# Patient Record
Sex: FEMALE | Race: OTHER | HISPANIC OR LATINO | Employment: OTHER | URBAN - METROPOLITAN AREA
[De-identification: names, ages, dates, MRNs, and addresses within clinical notes are randomized per-mention and may not be internally consistent; named-entity substitution may affect disease eponyms.]

---

## 2023-06-24 ENCOUNTER — APPOINTMENT (EMERGENCY)
Dept: RADIOLOGY | Facility: HOSPITAL | Age: 58
End: 2023-06-24
Payer: COMMERCIAL

## 2023-06-24 ENCOUNTER — HOSPITAL ENCOUNTER (INPATIENT)
Facility: HOSPITAL | Age: 58
LOS: 3 days | Discharge: HOME HEALTH | End: 2023-06-28
Attending: EMERGENCY MEDICINE | Admitting: INTERNAL MEDICINE
Payer: COMMERCIAL

## 2023-06-24 DIAGNOSIS — R26.2 AMBULATORY DYSFUNCTION: Primary | ICD-10-CM

## 2023-06-24 DIAGNOSIS — M79.604 RIGHT LEG PAIN: ICD-10-CM

## 2023-06-24 DIAGNOSIS — E66.01 MORBID OBESITY (HCC): ICD-10-CM

## 2023-06-24 PROBLEM — M25.569 KNEE PAIN: Status: ACTIVE | Noted: 2023-06-24

## 2023-06-24 LAB
ALBUMIN SERPL BCP-MCNC: 4.2 G/DL (ref 3.5–5)
ALP SERPL-CCNC: 114 U/L (ref 34–104)
ALT SERPL W P-5'-P-CCNC: 21 U/L (ref 7–52)
ANION GAP SERPL CALCULATED.3IONS-SCNC: 9 MMOL/L
AST SERPL W P-5'-P-CCNC: 26 U/L (ref 13–39)
BASOPHILS # BLD AUTO: 0.05 THOUSANDS/ÂΜL (ref 0–0.1)
BASOPHILS NFR BLD AUTO: 1 % (ref 0–1)
BILIRUB SERPL-MCNC: 0.53 MG/DL (ref 0.2–1)
BUN SERPL-MCNC: 23 MG/DL (ref 5–25)
CALCIUM SERPL-MCNC: 10 MG/DL (ref 8.4–10.2)
CHLORIDE SERPL-SCNC: 102 MMOL/L (ref 96–108)
CO2 SERPL-SCNC: 29 MMOL/L (ref 21–32)
CREAT SERPL-MCNC: 0.93 MG/DL (ref 0.6–1.3)
D DIMER PPP FEU-MCNC: 0.72 UG/ML FEU
EOSINOPHIL # BLD AUTO: 0.14 THOUSAND/ÂΜL (ref 0–0.61)
EOSINOPHIL NFR BLD AUTO: 1 % (ref 0–6)
ERYTHROCYTE [DISTWIDTH] IN BLOOD BY AUTOMATED COUNT: 14.7 % (ref 11.6–15.1)
GFR SERPL CREATININE-BSD FRML MDRD: 69 ML/MIN/1.73SQ M
GLUCOSE SERPL-MCNC: 100 MG/DL (ref 65–140)
HCT VFR BLD AUTO: 39.7 % (ref 34.8–46.1)
HGB BLD-MCNC: 12.6 G/DL (ref 11.5–15.4)
IMM GRANULOCYTES # BLD AUTO: 0.05 THOUSAND/UL (ref 0–0.2)
IMM GRANULOCYTES NFR BLD AUTO: 1 % (ref 0–2)
LYMPHOCYTES # BLD AUTO: 1.85 THOUSANDS/ÂΜL (ref 0.6–4.47)
LYMPHOCYTES NFR BLD AUTO: 18 % (ref 14–44)
MCH RBC QN AUTO: 28.5 PG (ref 26.8–34.3)
MCHC RBC AUTO-ENTMCNC: 31.7 G/DL (ref 31.4–37.4)
MCV RBC AUTO: 90 FL (ref 82–98)
MONOCYTES # BLD AUTO: 1.06 THOUSAND/ÂΜL (ref 0.17–1.22)
MONOCYTES NFR BLD AUTO: 10 % (ref 4–12)
NEUTROPHILS # BLD AUTO: 7.31 THOUSANDS/ÂΜL (ref 1.85–7.62)
NEUTS SEG NFR BLD AUTO: 69 % (ref 43–75)
NRBC BLD AUTO-RTO: 0 /100 WBCS
PLATELET # BLD AUTO: 257 THOUSANDS/UL (ref 149–390)
PMV BLD AUTO: 9 FL (ref 8.9–12.7)
POTASSIUM SERPL-SCNC: 3.7 MMOL/L (ref 3.5–5.3)
PROT SERPL-MCNC: 8.4 G/DL (ref 6.4–8.4)
RBC # BLD AUTO: 4.42 MILLION/UL (ref 3.81–5.12)
SODIUM SERPL-SCNC: 140 MMOL/L (ref 135–147)
WBC # BLD AUTO: 10.46 THOUSAND/UL (ref 4.31–10.16)

## 2023-06-24 PROCEDURE — 96374 THER/PROPH/DIAG INJ IV PUSH: CPT

## 2023-06-24 PROCEDURE — 99284 EMERGENCY DEPT VISIT MOD MDM: CPT | Performed by: EMERGENCY MEDICINE

## 2023-06-24 PROCEDURE — 85025 COMPLETE CBC W/AUTO DIFF WBC: CPT | Performed by: EMERGENCY MEDICINE

## 2023-06-24 PROCEDURE — 73564 X-RAY EXAM KNEE 4 OR MORE: CPT

## 2023-06-24 PROCEDURE — 80053 COMPREHEN METABOLIC PANEL: CPT | Performed by: EMERGENCY MEDICINE

## 2023-06-24 PROCEDURE — 85379 FIBRIN DEGRADATION QUANT: CPT | Performed by: EMERGENCY MEDICINE

## 2023-06-24 PROCEDURE — 99283 EMERGENCY DEPT VISIT LOW MDM: CPT

## 2023-06-24 PROCEDURE — 99223 1ST HOSP IP/OBS HIGH 75: CPT | Performed by: FAMILY MEDICINE

## 2023-06-24 PROCEDURE — 36415 COLL VENOUS BLD VENIPUNCTURE: CPT | Performed by: EMERGENCY MEDICINE

## 2023-06-24 RX ORDER — ACETAMINOPHEN 325 MG/1
975 TABLET ORAL EVERY 8 HOURS SCHEDULED
Status: DISCONTINUED | OUTPATIENT
Start: 2023-06-24 | End: 2023-06-28 | Stop reason: HOSPADM

## 2023-06-24 RX ORDER — CELECOXIB 200 MG/1
200 CAPSULE ORAL 2 TIMES DAILY
COMMUNITY

## 2023-06-24 RX ORDER — METHOCARBAMOL 500 MG/1
500 TABLET, FILM COATED ORAL ONCE
Status: COMPLETED | OUTPATIENT
Start: 2023-06-24 | End: 2023-06-24

## 2023-06-24 RX ORDER — HYDRALAZINE HYDROCHLORIDE 20 MG/ML
10 INJECTION INTRAMUSCULAR; INTRAVENOUS EVERY 4 HOURS PRN
Status: DISCONTINUED | OUTPATIENT
Start: 2023-06-24 | End: 2023-06-28 | Stop reason: HOSPADM

## 2023-06-24 RX ORDER — HYDROCHLOROTHIAZIDE 25 MG/1
25 TABLET ORAL DAILY
Status: DISCONTINUED | OUTPATIENT
Start: 2023-06-25 | End: 2023-06-28 | Stop reason: HOSPADM

## 2023-06-24 RX ORDER — CARVEDILOL 12.5 MG/1
1 TABLET ORAL 2 TIMES DAILY WITH MEALS
COMMUNITY
Start: 2023-05-08

## 2023-06-24 RX ORDER — ATORVASTATIN CALCIUM 20 MG/1
1 TABLET, FILM COATED ORAL EVERY EVENING
COMMUNITY
Start: 2023-02-06

## 2023-06-24 RX ORDER — ATORVASTATIN CALCIUM 20 MG/1
20 TABLET, FILM COATED ORAL EVERY EVENING
Status: DISCONTINUED | OUTPATIENT
Start: 2023-06-24 | End: 2023-06-28 | Stop reason: HOSPADM

## 2023-06-24 RX ORDER — CARVEDILOL 12.5 MG/1
12.5 TABLET ORAL 2 TIMES DAILY WITH MEALS
Status: DISCONTINUED | OUTPATIENT
Start: 2023-06-25 | End: 2023-06-24

## 2023-06-24 RX ORDER — PANTOPRAZOLE SODIUM 40 MG/1
40 TABLET, DELAYED RELEASE ORAL
Status: DISCONTINUED | OUTPATIENT
Start: 2023-06-25 | End: 2023-06-28 | Stop reason: HOSPADM

## 2023-06-24 RX ORDER — ENOXAPARIN SODIUM 100 MG/ML
40 INJECTION SUBCUTANEOUS 2 TIMES DAILY
Status: DISCONTINUED | OUTPATIENT
Start: 2023-06-24 | End: 2023-06-28 | Stop reason: HOSPADM

## 2023-06-24 RX ORDER — LEVOCETIRIZINE DIHYDROCHLORIDE 5 MG/1
5 TABLET, FILM COATED ORAL EVERY EVENING
COMMUNITY

## 2023-06-24 RX ORDER — FUROSEMIDE 10 MG/ML
40 INJECTION INTRAMUSCULAR; INTRAVENOUS
Status: COMPLETED | OUTPATIENT
Start: 2023-06-25 | End: 2023-06-25

## 2023-06-24 RX ORDER — FEXOFENADINE HCL 180 MG/1
180 TABLET ORAL DAILY
COMMUNITY

## 2023-06-24 RX ORDER — LOSARTAN POTASSIUM 50 MG/1
100 TABLET ORAL DAILY
Status: DISCONTINUED | OUTPATIENT
Start: 2023-06-25 | End: 2023-06-28 | Stop reason: HOSPADM

## 2023-06-24 RX ORDER — ESOMEPRAZOLE MAGNESIUM 40 MG/1
1 CAPSULE, DELAYED RELEASE ORAL
COMMUNITY
Start: 2023-04-17

## 2023-06-24 RX ORDER — HYDROCODONE BITARTRATE AND ACETAMINOPHEN 5; 325 MG/1; MG/1
2 TABLET ORAL EVERY 4 HOURS PRN
COMMUNITY

## 2023-06-24 RX ORDER — KETOROLAC TROMETHAMINE 30 MG/ML
15 INJECTION, SOLUTION INTRAMUSCULAR; INTRAVENOUS ONCE
Status: COMPLETED | OUTPATIENT
Start: 2023-06-24 | End: 2023-06-24

## 2023-06-24 RX ORDER — LOSARTAN POTASSIUM AND HYDROCHLOROTHIAZIDE 25; 100 MG/1; MG/1
1 TABLET ORAL DAILY
COMMUNITY
Start: 2023-04-10

## 2023-06-24 RX ORDER — CARVEDILOL 12.5 MG/1
12.5 TABLET ORAL 2 TIMES DAILY WITH MEALS
Status: DISCONTINUED | OUTPATIENT
Start: 2023-06-24 | End: 2023-06-26

## 2023-06-24 RX ORDER — OXYCODONE HYDROCHLORIDE 5 MG/1
5 TABLET ORAL EVERY 4 HOURS PRN
Status: DISCONTINUED | OUTPATIENT
Start: 2023-06-24 | End: 2023-06-28 | Stop reason: HOSPADM

## 2023-06-24 RX ORDER — OXYCODONE HYDROCHLORIDE 10 MG/1
10 TABLET ORAL EVERY 4 HOURS PRN
Status: DISCONTINUED | OUTPATIENT
Start: 2023-06-24 | End: 2023-06-28 | Stop reason: HOSPADM

## 2023-06-24 RX ADMIN — KETOROLAC TROMETHAMINE 15 MG: 30 INJECTION, SOLUTION INTRAMUSCULAR at 20:35

## 2023-06-24 RX ADMIN — ACETAMINOPHEN 975 MG: 325 TABLET, FILM COATED ORAL at 23:42

## 2023-06-24 RX ADMIN — METHOCARBAMOL 500 MG: 500 TABLET ORAL at 20:35

## 2023-06-24 RX ADMIN — CARVEDILOL 12.5 MG: 12.5 TABLET, FILM COATED ORAL at 23:42

## 2023-06-24 RX ADMIN — ATORVASTATIN CALCIUM 20 MG: 40 TABLET, FILM COATED ORAL at 23:42

## 2023-06-24 RX ADMIN — ENOXAPARIN SODIUM 40 MG: 40 INJECTION SUBCUTANEOUS at 23:43

## 2023-06-25 LAB
ANION GAP SERPL CALCULATED.3IONS-SCNC: 7 MMOL/L
BUN SERPL-MCNC: 26 MG/DL (ref 5–25)
CALCIUM SERPL-MCNC: 9.7 MG/DL (ref 8.4–10.2)
CHLORIDE SERPL-SCNC: 103 MMOL/L (ref 96–108)
CO2 SERPL-SCNC: 30 MMOL/L (ref 21–32)
CREAT SERPL-MCNC: 0.82 MG/DL (ref 0.6–1.3)
GFR SERPL CREATININE-BSD FRML MDRD: 80 ML/MIN/1.73SQ M
GLUCOSE SERPL-MCNC: 104 MG/DL (ref 65–140)
PLATELET # BLD AUTO: 255 THOUSANDS/UL (ref 149–390)
PMV BLD AUTO: 8.9 FL (ref 8.9–12.7)
POTASSIUM SERPL-SCNC: 3.3 MMOL/L (ref 3.5–5.3)
SODIUM SERPL-SCNC: 140 MMOL/L (ref 135–147)

## 2023-06-25 PROCEDURE — 99232 SBSQ HOSP IP/OBS MODERATE 35: CPT | Performed by: NURSE PRACTITIONER

## 2023-06-25 PROCEDURE — 80048 BASIC METABOLIC PNL TOTAL CA: CPT | Performed by: FAMILY MEDICINE

## 2023-06-25 PROCEDURE — 85049 AUTOMATED PLATELET COUNT: CPT | Performed by: FAMILY MEDICINE

## 2023-06-25 PROCEDURE — 97167 OT EVAL HIGH COMPLEX 60 MIN: CPT

## 2023-06-25 PROCEDURE — 97163 PT EVAL HIGH COMPLEX 45 MIN: CPT

## 2023-06-25 RX ORDER — FLUTICASONE PROPIONATE 50 MCG
1 SPRAY, SUSPENSION (ML) NASAL DAILY
COMMUNITY

## 2023-06-25 RX ORDER — LANOLIN ALCOHOL/MO/W.PET/CERES
400 CREAM (GRAM) TOPICAL 2 TIMES DAILY
Status: DISCONTINUED | OUTPATIENT
Start: 2023-06-25 | End: 2023-06-28 | Stop reason: HOSPADM

## 2023-06-25 RX ORDER — MULTIVIT-MINERALS/FOLIC ACID 0.4 MG
1 TABLET ORAL DAILY
Status: DISCONTINUED | OUTPATIENT
Start: 2023-06-25 | End: 2023-06-28 | Stop reason: HOSPADM

## 2023-06-25 RX ORDER — PHENOL 1.4 %
10 AEROSOL, SPRAY (ML) MUCOUS MEMBRANE
COMMUNITY

## 2023-06-25 RX ORDER — FLUTICASONE PROPIONATE 50 MCG
1 SPRAY, SUSPENSION (ML) NASAL DAILY
Status: DISCONTINUED | OUTPATIENT
Start: 2023-06-25 | End: 2023-06-28 | Stop reason: HOSPADM

## 2023-06-25 RX ORDER — POTASSIUM CHLORIDE 20 MEQ/1
40 TABLET, EXTENDED RELEASE ORAL
Status: COMPLETED | OUTPATIENT
Start: 2023-06-25 | End: 2023-06-25

## 2023-06-25 RX ORDER — CELECOXIB 100 MG/1
200 CAPSULE ORAL 2 TIMES DAILY
Status: DISCONTINUED | OUTPATIENT
Start: 2023-06-25 | End: 2023-06-28 | Stop reason: HOSPADM

## 2023-06-25 RX ADMIN — CARVEDILOL 12.5 MG: 12.5 TABLET, FILM COATED ORAL at 08:25

## 2023-06-25 RX ADMIN — ENOXAPARIN SODIUM 40 MG: 40 INJECTION SUBCUTANEOUS at 08:26

## 2023-06-25 RX ADMIN — ENOXAPARIN SODIUM 40 MG: 40 INJECTION SUBCUTANEOUS at 17:35

## 2023-06-25 RX ADMIN — HYDROCHLOROTHIAZIDE 25 MG: 25 TABLET ORAL at 08:26

## 2023-06-25 RX ADMIN — ATORVASTATIN CALCIUM 20 MG: 40 TABLET, FILM COATED ORAL at 17:35

## 2023-06-25 RX ADMIN — CELECOXIB 200 MG: 100 CAPSULE ORAL at 17:35

## 2023-06-25 RX ADMIN — POTASSIUM CHLORIDE 40 MEQ: 1500 TABLET, EXTENDED RELEASE ORAL at 22:41

## 2023-06-25 RX ADMIN — PANTOPRAZOLE SODIUM 40 MG: 40 TABLET, DELAYED RELEASE ORAL at 05:34

## 2023-06-25 RX ADMIN — ACETAMINOPHEN 975 MG: 325 TABLET, FILM COATED ORAL at 22:41

## 2023-06-25 RX ADMIN — POTASSIUM CHLORIDE 40 MEQ: 1500 TABLET, EXTENDED RELEASE ORAL at 18:41

## 2023-06-25 RX ADMIN — FUROSEMIDE 40 MG: 10 INJECTION, SOLUTION INTRAMUSCULAR; INTRAVENOUS at 17:51

## 2023-06-25 RX ADMIN — CELECOXIB 200 MG: 100 CAPSULE ORAL at 08:26

## 2023-06-25 RX ADMIN — POTASSIUM CHLORIDE 40 MEQ: 1500 TABLET, EXTENDED RELEASE ORAL at 20:19

## 2023-06-25 RX ADMIN — FLUTICASONE PROPIONATE 1 SPRAY: 50 SPRAY, METERED NASAL at 08:26

## 2023-06-25 RX ADMIN — OXYCODONE HYDROCHLORIDE 5 MG: 5 TABLET ORAL at 08:28

## 2023-06-25 RX ADMIN — OXYCODONE HYDROCHLORIDE 10 MG: 10 TABLET ORAL at 23:40

## 2023-06-25 RX ADMIN — ACETAMINOPHEN 975 MG: 325 TABLET, FILM COATED ORAL at 05:34

## 2023-06-25 RX ADMIN — CARVEDILOL 12.5 MG: 12.5 TABLET, FILM COATED ORAL at 17:35

## 2023-06-25 RX ADMIN — OXYCODONE HYDROCHLORIDE 10 MG: 10 TABLET ORAL at 13:10

## 2023-06-25 RX ADMIN — OXYCODONE HYDROCHLORIDE 10 MG: 10 TABLET ORAL at 17:34

## 2023-06-25 RX ADMIN — LOSARTAN POTASSIUM 100 MG: 50 TABLET, FILM COATED ORAL at 08:26

## 2023-06-25 RX ADMIN — ACETAMINOPHEN 975 MG: 325 TABLET, FILM COATED ORAL at 13:10

## 2023-06-25 RX ADMIN — FUROSEMIDE 40 MG: 10 INJECTION, SOLUTION INTRAMUSCULAR; INTRAVENOUS at 08:27

## 2023-06-25 RX ADMIN — Medication 400 MG: at 18:41

## 2023-06-25 NOTE — PROGRESS NOTES
[address] [place] [address]  Progress Note  Name: Kalli Sanchez  MRN: [id number]  Unit/Bed#: -01 I Date of Admission: 6/24/2023   Date of Service: 6/25/2023 I Hospital Day: 0    Assessment/Plan   * Knee pain  Assessment & Plan  · Knee pain with ambulatory dysfunction  · Suspicion for DVT with the way the patient describing her pain  Could be a ruptured Baker's cyst  · Follow-up with venous ultrasound  · May need to take a CT scan if the x-ray is negative as well  · PT OT evaluation  · Already has ambulatory dysfunction is the left hip  Patient can barely move it  Morbid obesity ([facility] [address] )  Assessment & Plan  · Encourage weight loss  · Patient received 2 doses of Lasix since the patient has some water retention as per the family and the patient  Hypertension  Assessment & Plan  · Continue home medications         VTE Pharmacologic Prophylaxis: VTE Score: 5 High Risk (Score >/= 5) - Pharmacological DVT Prophylaxis Ordered: enoxaparin (Lovenox)  Sequential Compression Devices Ordered  Patient Centered Rounds: I performed bedside rounds with nursing staff today  Discussions with Specialists or Other Care Team Provider: Reviewed notes    Education and Discussions with Family / Patient: Discussed with patient    Total Time Spent on Date of Encounter in care of patient: 35 minutes This time was spent on one or more of the following: performing physical exam; counseling and coordination of care; obtaining or reviewing history; documenting in the medical record; reviewing/ordering tests, medications or procedures; communicating with other healthcare professionals and discussing with patient's family/caregivers      Current Length of Stay: 0 day(s)  Current Patient Status: Observation   Certification Statement: The patient will continue to require additional inpatient hospital stay due to IV diuretics, pending x-ray, pending ultrasound pending PT evaluation  Discharge Plan: Anticipate discharge in 24-48 hrs to discharge location to be determined pending rehab evaluations  Code Status: Level 1 - Full Code    Subjective:   Patient is sitting up in bed with family at the bedside she has some concerns and fears related to being hospitalized understands that she will likely be going to rehab in the next day or 2 and is agreeable she reports that she is still having knee pain denies any chest pain chest tightness shortness of breath or difficulty breathing    Objective:     Vitals:   Temp (24hrs), Av 4 °F (36 9 °C), Min:98 3 °F (36 8 °C), Max:98 4 °F (36 9 °C)    Temp:  [98 3 °F (36 8 °C)-98 4 °F (36 9 °C)] 98 3 °F (36 8 °C)  HR:  [83-89] 89  Resp:  [18-20] 18  BP: (135-195)/(84-91) 135/84  SpO2:  [93 %-99 %] 93 %  Body mass index is 75 56 kg/m²  Input and Output Summary (last 24 hours): Intake/Output Summary (Last 24 hours) at 2023 0943  Last data filed at 2023 0533  Gross per 24 hour   Intake --   Output 725 ml   Net -725 ml       Physical Exam:   Physical Exam  Vitals reviewed  Cardiovascular:      Rate and Rhythm: Normal rate  Pulmonary:      Effort: No respiratory distress  Abdominal:      Palpations: Abdomen is soft  Skin:     General: Skin is warm  Neurological:      Mental Status: She is alert  Mental status is at baseline     Psychiatric:         Mood and Affect: Mood normal           Additional Data:     Labs:  Results from last 7 days   Lab Units 23   WBC Thousand/uL  --  10 46*   HEMOGLOBIN g/dL  --  12 6   HEMATOCRIT %  --  39 7   PLATELETS Thousands/uL 255 257   NEUTROS PCT %  --  69   LYMPHS PCT %  --  18   MONOS PCT %  --  10   EOS PCT %  --  1     Results from last 7 days   Lab Units 23   SODIUM mmol/L 140 140   POTASSIUM mmol/L 3 3* 3 7   CHLORIDE mmol/L 103 102   CO2 mmol/L 30 29   BUN mg/dL 26* 23   CREATININE mg/dL 0 82 0 93   ANION GAP mmol/L 7 9   CALCIUM mg/dL 9 7 10 0   ALBUMIN g/dL  --  4 2   TOTAL BILIRUBIN mg/dL  --  0 53   ALK PHOS U/L  --  114*   ALT U/L  --  21   AST U/L  --  26   GLUCOSE RANDOM mg/dL 104 100                       Lines/Drains:  Invasive Devices     Peripheral Intravenous Line  Duration           Peripheral IV 06/24/23 Right Antecubital <1 day                      Imaging: No pertinent imaging reviewed  Recent Cultures (last 7 days):         Last 24 Hours Medication List:   Current Facility-Administered Medications   Medication Dose Route Frequency Provider Last Rate   • acetaminophen  975 mg Oral Q8H [facility] Juan Daniel Dietrich MD     • atorvastatin  20 mg Oral QPM Juan Daniel Dietrich MD     • carvedilol  12 5 mg Oral BID With Meals Hermelinda Palma PA-C     • celecoxib  200 mg Oral BID Dorothy Lundberg PA-C     • enoxaparin  40 mg Subcutaneous BID Juan Daniel Dietrich MD     • fluticasone  1 spray Each Nare Daily Dorothy Lundberg PA-C     • furosemide  40 mg Intravenous BID (diuretic) Juan Daniel Dietrich MD     • hydrALAZINE  10 mg Intravenous Q4H PRN Juan Daniel Dietrich MD     • losartan  100 mg Oral Daily Juan Daniel Dietrich MD      And   • hydrochlorothiazide  25 mg Oral Daily Juan Daniel Dietrich MD     • morphine injection  2 mg Intravenous Q4H PRN Juan Daniel Dietrich MD     • One-A-Day Menopause Formula  1 tablet Oral Daily Dorothy Lundberg PA-C     • oxyCODONE  10 mg Oral Q4H PRN Juan Daniel Dietrich MD     • oxyCODONE  5 mg Oral Q4H PRN Juan Daniel Dietrich MD     • pantoprazole  40 mg Oral Early Morning Juan Daniel Dietrich MD          Today, Patient Was Seen By: ZEKE Joyce    **Please Note: This note may have been constructed using a voice recognition system  **

## 2023-06-25 NOTE — ASSESSMENT & PLAN NOTE
· Knee pain with ambulatory dysfunction  · Suspicion for DVT with the way the patient describing her pain  Could be a ruptured Baker's cyst  · Follow-up with venous ultrasound  · May need to take a CT scan if the x-ray is negative as well  · PT OT evaluation  · Already has ambulatory dysfunction is the left hip  Patient can barely move it

## 2023-06-25 NOTE — ASSESSMENT & PLAN NOTE
Body mass index is 75 56 kg/m²      · Encourage weight loss, dietary modifications   · Patient received 2 doses of Lasix

## 2023-06-25 NOTE — UTILIZATION REVIEW
"Initial Clinical Review    OBS order 6/24 2148 converted to IP on 6/25 1441 cont stay for amb dysfunction r/t Hip and knee pain   Admission: Date/Time/Statement:   Admission Orders (From admission, onward)     Ordered        06/25/23 1441  Inpatient Admission  Once            06/24/23 2148  Place in Observation  Once                       Inpatient Admission     Standing Status:   Standing     Number of Occurrences:   1     Order Specific Question:   Level of Care     Answer:   Med Surg [16]     Order Specific Question:   Estimated length of stay     Answer:   More than 2 Midnights     Order Specific Question:   Certification     Answer:   I certify that inpatient services are medically necessary for this patient for a duration of greater than two midnights  See H&P and MD Progress Notes for additional information about the patient's course of treatment  ED Arrival Information     Expected   -    Arrival   6/24/2023 18:32    Acuity   Less Urgent            Means of arrival   Ambulance    Escorted by   Nini Bonds   Hospitalist    Admission type   Emergency            Arrival complaint   Unable to stand            Chief Complaint   Patient presents with   • Knee Pain     The pt stated that 4 days ago she felt a \"pop\" in her R knee while trying stand up from the toilet  Since then the pt has not been able to bare weight and her pain hasnt gotten better  Initial Presentation: 56 y o  female to ED from home via EMS w/ R knee pain   Difficulty ambulating   she has had increased leg swelling and some water retention  Admitted OBS status w/ knee pain suspicion for DVT , could be ruptured Bakers cyst   F/u venous US   PT OT eval   HTN cont home meds   HLD statin   MO give 2 doses of lasix , pt w/ water retention   6/25 IM Note   Already has ambulatory dysfunction is the left hip  Patient can barely move it  PT OT eval pending   F/u venous US   Cont IV lasix        Date: 6/26  Day 3: Has " surpassed a 2nd midnight with active treatments and services, which include safe DC plan , likely STR   Cont monitoring of BP       ED Triage Vitals   Temperature Pulse Respirations Blood Pressure SpO2   06/24/23 2222 06/24/23 1835 06/24/23 1835 06/24/23 1835 06/24/23 1835   98 4 °F (36 9 °C) 83 20 (!) 195/91 99 %      Temp src Heart Rate Source Patient Position - Orthostatic VS BP Location FiO2 (%)   -- 06/24/23 1835 06/24/23 1835 06/24/23 1835 --    Monitor Lying Right arm       Pain Score       06/24/23 2235       No Pain          Wt Readings from Last 1 Encounters:   06/24/23 (!) 181 kg (399 lb 14 6 oz)     Additional Vital Signs:   06/26/23 10:04:15 -- 80 -- 158/79 105 91 % -- --   06/26/23 07:32:57 -- 84 -- 171/97 Abnormal  122 93 % -- --   06/26/23 0732 -- 89 -- 171/91 Abnormal  -- -- -- --   06/25/23 22:37:03 98 °F (36 7 °C) 81 -- 177/96 Abnormal  123 94 % -- --   06/25/23 17:39:21 98 5 °F (36 9 °C) 95 18 147/94 112 95 % -- --   06/25/23 1735 -- 90 -- 147/93 -- -- -- --   06/25/23 07:50:26 98 3 °F (36 8 °C) 89 18 135/84 101 93 % None (Room air) --       06/24/23 22:22:52 98 4 °F (36 9 °C) 86 -- 184/90 Abnormal  121 94 %        Pertinent Labs/Diagnostic Test Results:   XR knee 4+ vw right injury   Final Result by Dominik Olivares MD (06/25 1056)      No acute osseous abnormality  Degenerative changes as described              Workstation performed: [id number]         VAS upper limb venous duplex scan, unilateral/limited    (Results Pending)         Results from last 7 days   Lab Units 06/25/23  0524 06/24/23 2035   WBC Thousand/uL  --  10 46*   HEMOGLOBIN g/dL  --  12 6   HEMATOCRIT %  --  39 7   PLATELETS Thousands/uL 255 257   NEUTROS ABS Thousands/µL  --  7 31         Results from last 7 days   Lab Units 06/25/23  0524 06/24/23  2035   SODIUM mmol/L 140 140   POTASSIUM mmol/L 3 3* 3 7   CHLORIDE mmol/L 103 102   CO2 mmol/L 30 29   ANION GAP mmol/L 7 9   BUN mg/dL 26* 23   CREATININE mg/dL 0 82 0 93   EGFR ml/min/1 73sq m 80 69   CALCIUM mg/dL 9 7 10 0     Results from last 7 days   Lab Units 06/24/23 2035   AST U/L 26   ALT U/L 21   ALK PHOS U/L 114*   TOTAL PROTEIN g/dL 8 4   ALBUMIN g/dL 4 2   TOTAL BILIRUBIN mg/dL 0 53     Results from last 7 days   Lab Units 06/25/23  0524 06/24/23 2035   GLUCOSE RANDOM mg/dL 104 100     Results from last 7 days   Lab Units 06/24/23 2156   D-DIMER QUANTITATIVE ug/ml FEU 0 72*     ED Treatment:   Medication Administration from 06/24/2023 1832 to 06/24/2023 2212       Date/Time Order Dose Route Action Action by Comments     06/24/2023 2035 EDT ketorolac (TORADOL) injection 15 mg 15 mg Intravenous Given Emogene Hernandez --     06/24/2023 2035 EDT methocarbamol (ROBAXIN) tablet 500 mg 500 mg Oral Given Emogene Hernandez --        Past Medical History:   Diagnosis Date   • Hyperlipidemia    • Hypertension    • [facility] )    • Rheumatoid arthritis ([facility] )      Present on Admission:  • Knee pain  • Hypertension  • Morbid obesity ([facility] )      Admitting Diagnosis: Knee pain [M25 569]  Right leg pain [M79 604]  Ambulatory dysfunction [R26 2]  Age/Sex: 56 y o  female  Admission Orders:  Scheduled Medications:  acetaminophen, 975 mg, Oral, Q8H [address]  atorvastatin, 20 mg, Oral, QPM  carvedilol, 12 5 mg, Oral, BID With Meals  celecoxib, 200 mg, Oral, BID  enoxaparin, 40 mg, Subcutaneous, BID  fluticasone, 1 spray, Each Nare, Daily  furosemide, 40 mg, Intravenous, BID (diuretic)  losartan, 100 mg, Oral, Daily   And  hydrochlorothiazide, 25 mg, Oral, Daily  One-A-Day Menopause Formula, 1 tablet, Oral, Daily  pantoprazole, 40 mg, Oral, Early Morning      Continuous IV Infusions:     PRN Meds:  hydrALAZINE, 10 mg, Intravenous, Q4H PRN  morphine injection, 2 mg, Intravenous, Q4H PRN  oxyCODONE, 10 mg, Oral, Q4H PRN  oxyCODONE, 5 mg, Oral, Q4H PRN    PT OT eval   Up and OOb   Reg diet       Network Utilization Review Department  ATTENTION: Please call with any questions or concerns to [phone] and carefully listen to the prompts so that you are directed to the right person  All voicemails are confidential   Brenden Tolliver all requests for admission clinical reviews, approved or denied determinations and any other requests to dedicated fax number below belonging to the campus where the patient is receiving treatment   List of dedicated fax numbers for the Facilities:  [address] DENIALS (Administrative/Medical Necessity) [phone]   [address] (Maternity/NICU/Pediatrics) [phone]   [address] [phone]   [facility] [phone]   [address] [address] [facility] [place] [address] [place] [address] [place] [address] [facility] [phone]   1556 [facility] Devika Mcnally [facility] 134 815 [facility] [phone]

## 2023-06-25 NOTE — PLAN OF CARE
Problem: OCCUPATIONAL THERAPY ADULT  Goal: Performs self-care activities at highest level of function for planned discharge setting  See evaluation for individualized goals  Description: Treatment Interventions: ADL retraining, Functional transfer training, Endurance training, Equipment evaluation/education, Compensatory technique education, Activityengagement, UE strengthening/ROM          See flowsheet documentation for full assessment, interventions and recommendations  Note: Limitation: Decreased ADL status, Decreased UE strength, Decreased endurance, Decreased self-care trans, Decreased high-level ADLs  Prognosis: Good  Assessment: Patient is a 56 y o  female seen for OT evaluation s/p admit to [id number] [place] [address]  on 6/24/2023 w/Knee pain  Commorbidities affecting patient's functional performance at time of assessment include: HTN, HLD, and obesity  Orders placed for OT evaluation and treatment  Performed at least two patient identifiers during session including name and wristband  Prior to admission, Patient reporting being independent with ADLs/IADLs, ambulatory with rollator, and lives with   Personal factors affecting patient at time of initial evaluation include: steps to enter, difficulty performing ADLs and difficulty performing IADLs  Upon evaluation, patient requires minimal  assist for UB ADLs, moderate and maximal assist for LB ADLs, transfers with moderate assist of 2, with the use of Rolling Walker  Patient is oriented x 4  Occupational performance is affected by the following deficits: decreased muscle strength, dynamic sit/ stand balance deficit with poor standing tolerance time for self care and functional mobility, decreased activity tolerance, increased pain, delayed righting and equilibrium reactions and poor trunk control   Patient to benefit from continued Occupational Therapy treatment while in the hospital to address deficits as defined above and maximize level of functional independence with ADLs and functional mobility  Occupational Performance areas to address include: grooming , bathing/ shower, dressing, toilet hygiene, transfer to all surfaces and functional ambulation  From OT standpoint, recommendation at time of d/c would be Post acute rehabilitation services       OT Discharge Recommendation: Post acute rehabilitation services     [facility] OTD, OTR/L

## 2023-06-25 NOTE — H&P
[address] [place] [address]  H&P  Name: Abigail Eli 56 y o  female I MRN: [id number]  Unit/Bed#: ED 13 I Date of Admission: 6/24/2023   Date of Service: 6/24/2023 I Hospital Day: 0      Assessment/Plan   Morbid obesity ([address] )  Assessment & Plan  · Encourage weight loss  · I will give the patient 2 doses of Lasix since the patient has some water retention as per the family and the patient  Hyperlipidemia  Assessment & Plan  · Continue statin    Hypertension  Assessment & Plan  · Continue home medications  · As needed blood pressure medication    * Knee pain  Assessment & Plan  · Knee pain with ambulatory dysfunction  · Suspicion for DVT with the way the patient describing her pain  Could be a ruptured Baker's cyst  · Follow-up with venous ultrasound  · May need to take a CT scan if the x-ray is negative as well  · PT OT evaluation  · Already has ambulatory dysfunction is the left hip  Patient can barely move it  · May benefit from rehab         VTE Prophylaxis: Enoxaparin (Lovenox)  / sequential compression device   Code Status: Full code  POLST: POLST is not applicable to this patient  Discussion with family: Family at the bedside    Anticipated Length of Stay:  Patient will be admitted on an Observation basis with an anticipated length of stay of less than 2 midnights  Justification for Hospital Stay: Anticipate less than 2 midnights but given the patient's significant ambulatory dysfunction if the patient is not able to be discharged tomorrow I would have a low threshold of making her inpatient    Total Time for Visit, including Counseling / Coordination of Care: 60 minutes  Greater than 50% of this total time spent on direct patient counseling and coordination of care  Chief Complaint:   Right knee pain    History of Present Illness:    Abigail Eli is a 56 y o  female who presents with right knee pain     This is a very pleasant 56-year-old female patient acute right knee pain  She says she has tenderness in the back of the knee and she is having difficulty ambulating  She already has some left hip problems where she cannot really move her left leg and she usually ambulates with a walker however she is having difficulty ambulating since she hurt her knee  She initially stated it started with what felt like a cramp in the back of her knee but then she twisted a certain way and felt a pop and then gradually she has been unable to bear weight on that leg  Patient also states she has had increased leg swelling and some water retention  Patient thinks she may have gained significant weight in the past year  Patient denies any chest pain or shortness of breath  Review of Systems:    Review of Systems   Constitutional: Positive for activity change and fatigue  Cardiovascular: Positive for leg swelling  Musculoskeletal: Positive for arthralgias and gait problem  All other systems reviewed and are negative  Past Medical and Surgical History:     Past Medical History:   Diagnosis Date   • Hyperlipidemia    • Hypertension    • Morbid obesity ([facility] [address] )        History reviewed  No pertinent surgical history  Meds/Allergies:    Prior to Admission medications    Medication Sig Start Date End Date Taking? Authorizing Provider   atorvastatin (LIPITOR) 20 mg tablet Take 1 tablet by mouth every evening 2/6/23  Yes Historical Provider, MD   carvedilol (COREG) 12 5 mg tablet Take 1 tablet by mouth 2 (two) times a day with meals 5/8/23  Yes Historical Provider, MD   esomeprazole (NexIUM) 40 MG capsule Take 1 capsule by mouth daily before breakfast 4/17/23  Yes Historical Provider, MD   losartan-hydrochlorothiazide (HYZAAR) 100-25 MG per tablet Take 1 tablet by mouth daily 4/10/23  Yes Historical Provider, MD     I have reviewed home medications with patient personally      Allergies: No Known Allergies    Social History:     Marital Status: /Civil Union     Patient Pre-hospital Living Situation: Lives with her family  Patient Pre-hospital Level of Mobility: Ambulates with a walker  Patient Pre-hospital Diet Restrictions: None  Substance Use History:   Social History     Substance and Sexual Activity   Alcohol Use Never     Social History     Tobacco Use   Smoking Status Never   Smokeless Tobacco Not on file     Social History     Substance and Sexual Activity   Drug Use Never       Family History:    Family History   Problem Relation Age of Onset   • Coronary artery disease Maternal Grandfather    • Diabetes Maternal Grandfather        Physical Exam:     Vitals:   Blood Pressure: (!) 195/91 (06/24/23 1835)  Pulse: 83 (06/24/23 1835)  Respirations: 20 (06/24/23 1835)  SpO2: 99 % (06/24/23 1835)    Physical Exam    (   General Appearance:    Alert, cooperative, no distress, appears stated age   Head:    Normocephalic, without obvious abnormality, atraumatic   Eyes:    PERRL, conjunctiva/corneas clear, EOM's intact,             Nose:   Nares normal, septum midline, mucosa normal   Throat:   Lips, mucosa, and tongue normal; teeth and gums normal       Back:     Symmetric, no curvature, ROM normal, no CVA tenderness   Lungs:     Clear to auscultation bilaterally, respirations unlabored       Heart:    Regular rate and rhythm, S1 and S2 normal, no murmur, rub    or gallop   Abdomen:     Soft, non-tender, bowel sounds active all four quadrants,     no masses, no organomegaly           Extremities:  Patient with some chronic venous changes  She also has some tenderness over the right knee anterior surface just superior to the patella  Patient does have some edema   Pulses:   2+ and symmetric all extremities   Skin:   Skin color, texture, turgor normal, no rashes or lesions       Neurologic:   CNII-XII intact  Normal strength, sensation and reflexes       throughout         Additional Data:     Lab Results: I have personally reviewed pertinent reports        Results from last 7 days   Lab Units 06/24/23 2035   WBC Thousand/uL 10 46*   HEMOGLOBIN g/dL 12 6   HEMATOCRIT % 39 7   PLATELETS Thousands/uL 257   NEUTROS PCT % 69   LYMPHS PCT % 18   MONOS PCT % 10   EOS PCT % 1     Results from last 7 days   Lab Units 06/24/23 2035   SODIUM mmol/L 140   POTASSIUM mmol/L 3 7   CHLORIDE mmol/L 102   CO2 mmol/L 29   BUN mg/dL 23   CREATININE mg/dL 0 93   ANION GAP mmol/L 9   CALCIUM mg/dL 10 0   ALBUMIN g/dL 4 2   TOTAL BILIRUBIN mg/dL 0 53   ALK PHOS U/L 114*   ALT U/L 21   AST U/L 26   GLUCOSE RANDOM mg/dL 100                       Imaging: I have personally reviewed pertinent reports  XR knee 4+ vw right injury    (Results Pending)   VAS upper limb venous duplex scan, unilateral/limited    (Results Pending)         Allscripts / Epic Records Reviewed: Yes     ** Please Note: This note has been constructed using a voice recognition system   **

## 2023-06-25 NOTE — PLAN OF CARE
Problem: INFECTION - ADULT  Goal: Absence or prevention of progression during hospitalization  Description: INTERVENTIONS:  - Assess and monitor for signs and symptoms of infection  - Monitor lab/diagnostic results  - Monitor all insertion sites, i e  indwelling lines, tubes, and drains  - Monitor endotracheal if appropriate and nasal secretions for changes in amount and color  - [place] appropriate cooling/warming therapies per order  - Administer medications as ordered  - Instruct and encourage patient and family to use good hand hygiene technique  - Identify and instruct in appropriate isolation precautions for identified infection/condition  Outcome: Progressing        Problem: PAIN - ADULT  Goal: Verbalizes/displays adequate comfort level or baseline comfort level  Description: Interventions:  - Encourage patient to monitor pain and request assistance  - Assess pain using appropriate pain scale  - Administer analgesics based on type and severity of pain and evaluate response  - Implement non-pharmacological measures as appropriate and evaluate response  - Consider cultural and social influences on pain and pain management  - Notify physician/advanced practitioner if interventions unsuccessful or patient reports new pain  Outcome: Progressing

## 2023-06-25 NOTE — PHYSICAL THERAPY NOTE
Physical Therapy Evaluation     Patient's Name: Marquis Powell    Admitting Diagnosis  Knee pain [M25 569]  Right leg pain [M79 604]  Ambulatory dysfunction [R26 2]    Problem List  Patient Active Problem List   Diagnosis    Knee pain    Hypertension    Hyperlipidemia    Morbid obesity [facility])     Past Medical History  Past Medical History:   Diagnosis Date    Hyperlipidemia     Hypertension     Morbid obesity ([facility] [address] )     Rheumatoid arthritis ([facility] [address] )      Past Surgical History  Past Surgical History:   Procedure Laterality Date    TONSILLECTOMY        06/25/23 1253   PT Last Visit   PT Visit Date 06/25/23   Note Type   Note type Evaluation   Pain Assessment   Pain Assessment Tool 0-10   Pain Score   (4/10 at rest; 6/10 with standing; 10/10 with return to sitting position)   Pain Location/Orientation Location: Generalized   Pain Onset/Description Onset: Ongoing   Hospital Pain Intervention(s) Repositioned; Ambulation/increased activity  (RN aware)   Restrictions/Precautions   Weight Bearing Precautions Per Order No   Braces or Orthoses Other (Comment)  (right elastic knee brace)   Other Precautions Chair Alarm; Bed Alarm;Multiple lines; Fall Risk;Pain   Home Living   Type of [address] [place] [address] One level; Able to live on main level with bedroom/bathroom; Performs ADLs on one level;Stairs to enter with rails  (4 VLAD with bilateral railings)   Bathroom Shower/Tub Tub/shower unit   Bathroom Toilet Standard   Bathroom Equipment Grab bars in shower; Shower chair;Grab bars around [employer] (Comment)  (rollator; lift chair)   Additional Comments Pt ambulates with a rollator  Prior Function   Level of [place] Independent with functional mobility; Independent with ADLs; Independent with IADLS   Lives With Spouse   Receives Help From Family   IADLs Independent with meal prep; Independent with medication management; Family/Friend/Other provides "transportation   Falls in the last 6 months 0   Vocational On disability   Comments Pt reports that she hasn't been able to ambulate for the past 3 days  General   Family/Caregiver Present Yes  (pt's niece)   Cognition   Overall Cognitive Status WFL   Arousal/Participation Alert   Orientation Level Oriented X4   Memory Within functional limits   Following Commands Follows all commands and directions without difficulty   Comments Pt agreeable to PT  Subjective   Subjective \"I haven't tried bearing weight yet  \"   RLE Assessment   RLE Assessment X   Strength RLE   R Hip Flexion 3+/5   R Knee Extension 3-/5   R Ankle Dorsiflexion 3+/5   LLE Assessment   LLE Assessment X   Strength LLE   L Hip Flexion 3-/5   L Knee Extension 3+/5   L Ankle Dorsiflexion 3+/5   Vision-Basic Assessment   Current Vision Wears glasses only for reading   Light Touch   RLE Light Touch Grossly intact   LLE Light Touch Grossly intact   Bed Mobility   Supine to Sit 3  Moderate assistance   Additional items Assist x 2;HOB elevated; Bedrails; Increased time required;Verbal cues;LE management   Sit to Supine 3  Moderate assistance   Additional items Assist x 2; Increased time required;Verbal cues;LE management   Transfers   Sit to Stand 3  Moderate assistance   Additional items Assist x 2; Increased time required;Verbal cues   Stand to Sit 3  Moderate assistance   Additional items Assist x 2; Increased time required;Verbal cues    Forward flexed trunk in standing; cues for upright posture   Ambulation/Elevation   Ambulation/Elevation Additional Comments Attempted; pt unable to shift weight appropriately to initiate gait at this time   Balance   Static Sitting Fair +   Dynamic Sitting Fair   Static Standing Poor   Dynamic Standing Poor -   Endurance Deficit   Endurance Deficit Yes   Endurance Deficit Description decreased activity tolerance   Activity Tolerance   Activity Tolerance Patient limited by pain   Medical Staff [address] Yanez; OT student " Chacha  (Co-evaluation performed with OT secondary to complex medical condition of patient and regression of functional status from baseline  PT/OT goals were addressed separately )   Nurse Made Aware INÉS Yang Resides   Assessment   Prognosis Good   Problem List Decreased strength;Decreased endurance; Impaired balance;Decreased mobility;Obesity;Pain   Assessment Pt is 56year old female seen for PT evaluation s/p admit to [facility] on 6/24/2023 with Knee pain  PT consulted to assess pt's functional mobility and discharge needs  Order placed for PT evaluation and treatment, with up and out of bed as tolerated order  Comorbidities affecting pt's physical performance at time of assessment include hypertension, hyperlipidemia, and morbid obesity  Prior to hospitalization, pt was independent with all functional mobility with a rollator  Pt ambulates household distances  Pt resides with her spouse, in a one level house, with 4 steps to enter  Personal factors affecting pt at time of initial evaluation include stairs to enter home, inability to ambulate household distances, inability to navigate level surfaces without external assistance, unable to perform dynamic tasks in the community, limited home support, difficulty performing ADLs, and inability to perform IADLs  Please find objective findings from PT assessment regarding body systems outlined above with impairments and limitations including weakness, impaired balance, decreased endurance, pain, decreased activity tolerance, decreased functional mobility tolerance, and fall risk  The following objective measures were performed on initial evaluation Barthel Index: 40/100, Modified [place]: 4 (moderate/severe disability) and AM-PAC 6-Clicks: 9/64   Pt's clinical presentation is currently unstable/unpredictable seen in pt's presentation of need for ongoing medical management/monitoring, pt is a fall risk, and pt requires cues and assist of two for safety with functional mobility  Pt to benefit from continued PT treatment to address deficits as defined above and maximize pt's level of function and independence with mobility  From a PT standpoint, recommendation at time of discharge would be STR pending pt's progress in order to facilitate return to prior level of function  Barriers to Discharge Inaccessible home environment;Decreased caregiver support   Goals   STG Expiration Date 07/05/23   Short Term Goal #1 In 10 days: Increase bilateral LE strength 1/2 grade to facilitate independent mobility, Perform all bed mobility tasks with min A of 1 to decrease caregiver burden, Perform all transfers with min A of 1 to improve independence, Increase all balance 1/2 grade to decrease risk for falls and PT to see and establish goals for gait when appropriate   Plan   Treatment/Interventions Functional transfer training;LE strengthening/ROM; Therapeutic exercise; Endurance training;Patient/family training;Bed mobility;Continued evaluation;Spoke to nursing;OT;Family   PT Frequency 3-5x/wk   Recommendation   PT Discharge Recommendation Post acute rehabilitation services   AM-PAC Basic Mobility Inpatient   Turning in Flat Bed Without Bedrails 2   Lying on Back to Sitting on Edge of Flat Bed Without Bedrails 1   Moving Bed to Chair 1   Standing Up From Chair Using Arms 1   Walk in Room 1   Climb 3-5 Stairs With Railing 1   Basic Mobility Inpatient Raw Score 7   Turning Head Towards Sound 4   Follow Simple Instructions 4   Low Function Basic Mobility Raw Score  15   Low Function Basic Mobility Standardized Score  23 9   Highest Level Of Mobility   -HL Goal 2: Bed activities/Dependent transfer   -HL Achieved 3: Sit at edge of bed   Modified [place] Scale   Modified [place] Scale 4   Barthel Index   Feeding 10   Bathing 0   Grooming Score 0   Dressing Score 5   Bladder Score 5   Bowels Score 10   Toilet Use Score 5   Transfers (Bed/Chair) Score 5   Mobility (Level Surface) Score 0   Stairs Score 0   Barthel Index Score 40     PT Evaluation Time: 8639-3070    Mohamud Cornejo, PT, DPT

## 2023-06-25 NOTE — ASSESSMENT & PLAN NOTE
· Encourage weight loss  · I will give the patient 2 doses of Lasix since the patient has some water retention as per the family and the patient

## 2023-06-25 NOTE — ASSESSMENT & PLAN NOTE
· Knee pain with ambulatory dysfunction  · Suspicion for DVT with the way the patient describing her pain  Could be a ruptured Baker's cyst  · Follow-up with venous ultrasound  · May need to take a CT scan if the x-ray is negative as well  · PT OT evaluation  · Already has ambulatory dysfunction is the left hip  Patient can barely move it    · May benefit from rehab

## 2023-06-25 NOTE — OCCUPATIONAL THERAPY NOTE
Occupational Therapy Evaluation      Rose Jarvis    6/25/2023    Patient Active Problem List   Diagnosis    Knee pain    Hypertension    Hyperlipidemia    Morbid obesity ([facility] [address] )       Past Medical History:   Diagnosis Date    Hyperlipidemia     Hypertension     Morbid obesity ([facility] [address] )     Rheumatoid arthritis [facility])        Past Surgical History:   Procedure Laterality Date    TONSILLECTOMY          06/25/23 1318   OT Last Visit   OT Visit Date 06/25/23   Note Type   Note type Evaluation   Additional Comments Pt agreeable to OT eval  Upon arrival pt supine in bed with HOB elevated  Pain Assessment   Pain Assessment Tool 0-10   Pain Score   (4/10 at rest; 6/10 with standing; 10/10 with return to sitting position)   Pain Location/Orientation Orientation: Right;Location: Knee   Pain Onset/Description Onset: Ongoing;Frequency: Constant/Continuous   Hospital Pain Intervention(s) Ambulation/increased activity;Repositioned;Medication (See MAR)   Restrictions/Precautions   Weight Bearing Precautions Per Order No   Braces or Orthoses Other (Comment)  (right elastic knee brace at bedside- pt ordered online this past week)   Other Precautions Bed Alarm; Fall Risk;Pain;Multiple lines   Home Living   Type of [address] [place] [address] One level;Performs ADLs on one level; Able to live on main level with bedroom/bathroom;Stairs to enter with rails  (4 VLAD)   Bathroom Shower/Tub Tub/shower unit   Bathroom Toilet Standard   Bathroom Equipment Grab bars in shower; Shower chair;Grab bars around toilet;Commode   [address] Cane;Walker  (utilizes rollator at baseline)   Additional Comments Pt has been sleeping in lift chair recently  Typically sleeps in regular bed at baseline   Prior Function   Level of [place] Independent with functional mobility; Independent with ADLs; Independent with IADLS  (pt reports wearing mostly dresses at baseline with slip-on shoes)   Lives With [facility] Help From Family   IADLs Independent with meal prep; Independent with medication management; Family/Friend/Other provides transportation   Falls in the last 6 months 0   Vocational On disability   Comments Pt reports that she hasn't left the house since COVID pandemic   Lifestyle   Autonomy Patient reporting being independent with ADLs/IADLs, ambulatory with rollator, and lives with    Reciprocal Relationships  and niece   Service to Others On disability   Intrinsic Gratification Reading   General   Family/Caregiver Present Yes  (Niece present during session)   ADL   Eating Assistance 6  Modified independent   Grooming Assistance 6  Modified Independent   UB Bathing Assistance 4  Minimal Assistance   LB [address] 3  Moderate Assistance   UB Dressing Assistance 4  Minimal Assistance    [facility] [address] 2  Maximal [address]  2  Maximal Assistance   Additional Comments ADL levels based on functional performance during OT eval   Bed Mobility   Supine to Sit 3  Moderate assistance   Additional items Assist x 2;HOB elevated; Bedrails; Increased time required;Verbal cues;LE management   Sit to Supine 3  Moderate assistance   Additional items Assist x 2;HOB elevated; Bedrails; Increased time required;Verbal cues;LE management   Transfers   Sit to Stand 3  Moderate assistance   Additional items Assist x 2;Bedrails; Increased time required;Verbal cues   Stand to Sit 3  Moderate assistance   Additional items Assist x 2;Bedrails; Increased time required;Verbal cues   Additional Comments Pt noted to have forward flexed trunk in standing position   Balance   Static Sitting Fair +   Dynamic Sitting Fair   Static Standing Poor   Dynamic Standing Poor -   Activity Tolerance   Activity Tolerance Patient limited by pain   Medical Staff Made Aware Pt seen as a co-eval with PT Remi Brown due to the patient's co-morbidities, clinically unstable presentation, and present impairments which are a regression from the patient's baseline  RUE Assessment   RUE Assessment WFL  (full AROM, 4/5 MMT)   LUE Assessment   LUE Assessment WFL  (full AROM, 4/5 MMT)   Hand Function   Gross Motor Coordination Functional   Fine Motor Coordination Functional   Sensation   Light Touch No apparent deficits   Vision-Basic Assessment   Current Vision Wears glasses only for reading   Cognition   Overall Cognitive Status [facility]   Arousal/Participation Alert; Responsive; Cooperative   Attention Within functional limits   Orientation Level Oriented X4   Memory Within functional limits   Following Commands Follows all commands and directions without difficulty   Assessment   Limitation Decreased ADL status; Decreased UE strength;Decreased endurance;Decreased self-care trans;Decreased high-level ADLs   Prognosis Good   Assessment Patient is a 56 y o  female seen for OT evaluation s/p admit to [address] [place] [address]  on 6/24/2023 w/Knee pain  Commorbidities affecting patient's functional performance at time of assessment include: HTN, HLD, and obesity  Orders placed for OT evaluation and treatment  Performed at least two patient identifiers during session including name and wristband  Prior to admission, Patient reporting being independent with ADLs/IADLs, ambulatory with rollator, and lives with   Personal factors affecting patient at time of initial evaluation include: steps to enter, difficulty performing ADLs and difficulty performing IADLs  Upon evaluation, patient requires minimal  assist for UB ADLs, moderate and maximal assist for LB ADLs, transfers with moderate assist of 2, with the use of Rolling Walker  Patient is oriented x 4    Occupational performance is affected by the following deficits: decreased muscle strength, dynamic sit/ stand balance deficit with poor standing tolerance time for self care and functional mobility, decreased activity tolerance, increased pain, delayed righting and equilibrium reactions and poor trunk control  Patient to benefit from continued Occupational Therapy treatment while in the hospital to address deficits as defined above and maximize level of functional independence with ADLs and functional mobility  Occupational Performance areas to address include: grooming , bathing/ shower, dressing, toilet hygiene, transfer to all surfaces and functional ambulation  From OT standpoint, recommendation at time of d/c would be Post acute rehabilitation services  Goals   Patient Goals to have less pain   Plan   Treatment Interventions ADL retraining;Functional transfer training; Endurance training;Equipment evaluation/education; Compensatory technique education; Activityengagement;UE strengthening/ROM   Goal Expiration Date 07/10/23   OT Treatment Day 0   OT Frequency 3-5x/wk   Recommendation   OT Discharge Recommendation Post acute rehabilitation services   AM-PAC Daily Activity Inpatient   Lower Body Dressing 1   Bathing 2   Toileting 1   Upper Body Dressing 3   Grooming 4   Eating 4   Daily Activity Raw Score 15   Daily Activity Standardized Score (Calc for Raw Score >=11) 34 69   AM-PAC Applied Cognition Inpatient   Following a Speech/Presentation 4   Understanding Ordinary Conversation 4   Taking Medications 4   Remembering Where Things Are Placed or Put Away 4   Remembering List of 4-5 Errands 4   Taking Care of Complicated Tasks 4   Applied Cognition Raw Score 24   Applied Cognition Standardized Score 62 21   Modified [place] Scale   Modified [place] Scale 4   End of Consult   Patient Position at End of Consult Supine;Bed/Chair alarm activated; All needs within reach   Nurse Communication Nurse aware of consult     GOALS:    *ADL transfers with (I) for inc'd independence with ADLs/purposeful tasks    *UB ADL with (I) for inc'd independence with self cares    *LB ADL with (I) using AE prn for inc'd independence with self cares    *Toileting with (I) for clothing management and hygiene for return to PLOF with personal care    *Increase stand tolerance x 5  m for inc'd tolerance with standing purposeful tasks    *Participate in 10m UE therex to increase overall stamina/activity tolerance for purposeful tasks    *Bed mobility- (I) for inc'd independence to manage own comfort and initiate EOB & OOB purposeful tasks    *Patient will verbalize 3 safety awareness/ principles to prevent falls in the home setting  *Patient will increase OOB/sitting tolerance to 2-4 hours per day to increase participation in self-care and leisure tasks with no s/s of exertion       *Pt will demonstrate use of long handled AE during 100% of tx sessions for increased ADL safety and independence following D/C     Maribel Sanchez, SHY, OTR/L

## 2023-06-25 NOTE — PLAN OF CARE
Problem: PHYSICAL THERAPY ADULT  Goal: Performs mobility at highest level of function for planned discharge setting  See evaluation for individualized goals  Description: Treatment/Interventions: Functional transfer training, LE strengthening/ROM, Therapeutic exercise, Endurance training, Patient/family training, Bed mobility, Continued evaluation, Spoke to nursing, OT, Family          See flowsheet documentation for full assessment, interventions and recommendations  Note: Prognosis: Good  Problem List: Decreased strength, Decreased endurance, Impaired balance, Decreased mobility, Obesity, Pain  Assessment: Pt is 56year old female seen for PT evaluation s/p admit to [facility] on 6/24/2023 with Knee pain  PT consulted to assess pt's functional mobility and discharge needs  Order placed for PT evaluation and treatment, with up and out of bed as tolerated order  Comorbidities affecting pt's physical performance at time of assessment include hypertension, hyperlipidemia, and morbid obesity  Prior to hospitalization, pt was independent with all functional mobility with a rollator  Pt ambulates household distances  Pt resides with her spouse, in a one level house, with 4 steps to enter  Personal factors affecting pt at time of initial evaluation include stairs to enter home, inability to ambulate household distances, inability to navigate level surfaces without external assistance, unable to perform dynamic tasks in the community, limited home support, difficulty performing ADLs, and inability to perform IADLs  Please find objective findings from PT assessment regarding body systems outlined above with impairments and limitations including weakness, impaired balance, decreased endurance, pain, decreased activity tolerance, decreased functional mobility tolerance, and fall risk   The following objective measures were performed on initial evaluation Barthel Index: 40/100, Modified [place]: 4 (moderate/severe disability) and AM-PAC 6-Clicks: 6/72  Pt's clinical presentation is currently unstable/unpredictable seen in pt's presentation of need for ongoing medical management/monitoring, pt is a fall risk, and pt requires cues and assist of two for safety with functional mobility  Pt to benefit from continued PT treatment to address deficits as defined above and maximize pt's level of function and independence with mobility  From a PT standpoint, recommendation at time of discharge would be STR pending pt's progress in order to facilitate return to prior level of function  Barriers to Discharge: Inaccessible home environment, Decreased caregiver support     PT Discharge Recommendation: Post acute rehabilitation services    See flowsheet documentation for full assessment

## 2023-06-26 ENCOUNTER — APPOINTMENT (INPATIENT)
Dept: VASCULAR ULTRASOUND | Facility: HOSPITAL | Age: 58
End: 2023-06-26
Payer: COMMERCIAL

## 2023-06-26 PROBLEM — R26.2 AMBULATORY DYSFUNCTION: Status: ACTIVE | Noted: 2023-06-26

## 2023-06-26 LAB
ANION GAP SERPL CALCULATED.3IONS-SCNC: 9 MMOL/L
BUN SERPL-MCNC: 25 MG/DL (ref 5–25)
CALCIUM SERPL-MCNC: 9.5 MG/DL (ref 8.4–10.2)
CHLORIDE SERPL-SCNC: 101 MMOL/L (ref 96–108)
CO2 SERPL-SCNC: 30 MMOL/L (ref 21–32)
CREAT SERPL-MCNC: 0.91 MG/DL (ref 0.6–1.3)
ERYTHROCYTE [DISTWIDTH] IN BLOOD BY AUTOMATED COUNT: 14.5 % (ref 11.6–15.1)
GFR SERPL CREATININE-BSD FRML MDRD: 71 ML/MIN/1.73SQ M
GLUCOSE SERPL-MCNC: 103 MG/DL (ref 65–140)
HCT VFR BLD AUTO: 39.6 % (ref 34.8–46.1)
HGB BLD-MCNC: 12.6 G/DL (ref 11.5–15.4)
MAGNESIUM SERPL-MCNC: 2 MG/DL (ref 1.9–2.7)
MCH RBC QN AUTO: 28.5 PG (ref 26.8–34.3)
MCHC RBC AUTO-ENTMCNC: 31.8 G/DL (ref 31.4–37.4)
MCV RBC AUTO: 90 FL (ref 82–98)
PLATELET # BLD AUTO: 268 THOUSANDS/UL (ref 149–390)
PMV BLD AUTO: 9.1 FL (ref 8.9–12.7)
POTASSIUM SERPL-SCNC: 3.9 MMOL/L (ref 3.5–5.3)
RBC # BLD AUTO: 4.42 MILLION/UL (ref 3.81–5.12)
SODIUM SERPL-SCNC: 140 MMOL/L (ref 135–147)
WBC # BLD AUTO: 9.55 THOUSAND/UL (ref 4.31–10.16)

## 2023-06-26 PROCEDURE — 83735 ASSAY OF MAGNESIUM: CPT | Performed by: INTERNAL MEDICINE

## 2023-06-26 PROCEDURE — 97530 THERAPEUTIC ACTIVITIES: CPT

## 2023-06-26 PROCEDURE — 99232 SBSQ HOSP IP/OBS MODERATE 35: CPT | Performed by: PHYSICIAN ASSISTANT

## 2023-06-26 PROCEDURE — 85027 COMPLETE CBC AUTOMATED: CPT | Performed by: NURSE PRACTITIONER

## 2023-06-26 PROCEDURE — 93971 EXTREMITY STUDY: CPT | Performed by: SURGERY

## 2023-06-26 PROCEDURE — 80048 BASIC METABOLIC PNL TOTAL CA: CPT | Performed by: NURSE PRACTITIONER

## 2023-06-26 PROCEDURE — 97535 SELF CARE MNGMENT TRAINING: CPT

## 2023-06-26 PROCEDURE — 93971 EXTREMITY STUDY: CPT

## 2023-06-26 RX ORDER — LANOLIN ALCOHOL/MO/W.PET/CERES
9 CREAM (GRAM) TOPICAL
Status: DISCONTINUED | OUTPATIENT
Start: 2023-06-26 | End: 2023-06-28 | Stop reason: HOSPADM

## 2023-06-26 RX ORDER — CARVEDILOL 12.5 MG/1
25 TABLET ORAL 2 TIMES DAILY WITH MEALS
Status: DISCONTINUED | OUTPATIENT
Start: 2023-06-26 | End: 2023-06-28 | Stop reason: HOSPADM

## 2023-06-26 RX ORDER — CARVEDILOL 12.5 MG/1
12.5 TABLET ORAL ONCE
Status: COMPLETED | OUTPATIENT
Start: 2023-06-26 | End: 2023-06-26

## 2023-06-26 RX ADMIN — Medication 400 MG: at 09:29

## 2023-06-26 RX ADMIN — HYDROCHLOROTHIAZIDE 25 MG: 25 TABLET ORAL at 09:29

## 2023-06-26 RX ADMIN — CARVEDILOL 12.5 MG: 12.5 TABLET, FILM COATED ORAL at 07:32

## 2023-06-26 RX ADMIN — DICLOFENAC SODIUM 4 G: 10 GEL TOPICAL at 18:04

## 2023-06-26 RX ADMIN — Medication 9 MG: at 00:15

## 2023-06-26 RX ADMIN — DICLOFENAC SODIUM 4 G: 10 GEL TOPICAL at 11:12

## 2023-06-26 RX ADMIN — ATORVASTATIN CALCIUM 20 MG: 40 TABLET, FILM COATED ORAL at 18:04

## 2023-06-26 RX ADMIN — Medication 1 TABLET: at 22:37

## 2023-06-26 RX ADMIN — CELECOXIB 200 MG: 100 CAPSULE ORAL at 09:28

## 2023-06-26 RX ADMIN — DICLOFENAC SODIUM 4 G: 10 GEL TOPICAL at 22:37

## 2023-06-26 RX ADMIN — Medication 9 MG: at 22:37

## 2023-06-26 RX ADMIN — OXYCODONE HYDROCHLORIDE 5 MG: 5 TABLET ORAL at 22:35

## 2023-06-26 RX ADMIN — ACETAMINOPHEN 975 MG: 325 TABLET, FILM COATED ORAL at 13:58

## 2023-06-26 RX ADMIN — ENOXAPARIN SODIUM 40 MG: 40 INJECTION SUBCUTANEOUS at 09:30

## 2023-06-26 RX ADMIN — OXYCODONE HYDROCHLORIDE 10 MG: 10 TABLET ORAL at 12:29

## 2023-06-26 RX ADMIN — LOSARTAN POTASSIUM 100 MG: 50 TABLET, FILM COATED ORAL at 09:29

## 2023-06-26 RX ADMIN — ENOXAPARIN SODIUM 40 MG: 40 INJECTION SUBCUTANEOUS at 18:04

## 2023-06-26 RX ADMIN — FLUTICASONE PROPIONATE 1 SPRAY: 50 SPRAY, METERED NASAL at 09:30

## 2023-06-26 RX ADMIN — CELECOXIB 200 MG: 100 CAPSULE ORAL at 18:04

## 2023-06-26 RX ADMIN — OXYCODONE HYDROCHLORIDE 5 MG: 5 TABLET ORAL at 07:32

## 2023-06-26 RX ADMIN — ACETAMINOPHEN 975 MG: 325 TABLET, FILM COATED ORAL at 22:35

## 2023-06-26 RX ADMIN — PANTOPRAZOLE SODIUM 40 MG: 40 TABLET, DELAYED RELEASE ORAL at 07:32

## 2023-06-26 RX ADMIN — Medication 400 MG: at 18:04

## 2023-06-26 RX ADMIN — OXYCODONE HYDROCHLORIDE 5 MG: 5 TABLET ORAL at 18:12

## 2023-06-26 RX ADMIN — CARVEDILOL 25 MG: 12.5 TABLET, FILM COATED ORAL at 18:04

## 2023-06-26 RX ADMIN — ACETAMINOPHEN 975 MG: 325 TABLET, FILM COATED ORAL at 07:32

## 2023-06-26 RX ADMIN — CARVEDILOL 12.5 MG: 12.5 TABLET, FILM COATED ORAL at 09:29

## 2023-06-26 NOTE — PLAN OF CARE
Problem: PHYSICAL THERAPY ADULT  Goal: Performs mobility at highest level of function for planned discharge setting  See evaluation for individualized goals  Description: Treatment/Interventions: Functional transfer training, LE strengthening/ROM, Therapeutic exercise, Endurance training, Patient/family training, Bed mobility, Continued evaluation, Spoke to nursing, OT, Family          See flowsheet documentation for full assessment, interventions and recommendations  Note: Prognosis: Good  Problem List: Decreased strength, Decreased endurance, Impaired balance, Decreased mobility, Obesity, Pain  Assessment: Pt seen for PT treatment today with a focus on ther act and ther ex to facilitate return to PLOF and promote functional strengthening  Ther act consisted of bed mobility, sit<>stand transfers; as well as weight shifting to progress towards gait training for level surface ambulation with RW management, and stair negotiation  Therapeutic exercise consisted of seated LAQ and ankle pumps to prepare the pt for weight bearing  Since previous session, pt has demonstrated good progress in terms of performing 4 sit<>stands, standing for 45-60 sec, and performing weight shifting with 1 step requiring ModAx2 and RW for UE support  Education provided on foot placement to unweight the painful LE in order to reduce pain with weight bearing with pt demonstrating and verbalizing understanding  Pt is progressing towards their goals pending approval from a medical standpoint  Prognosis is good due to the listed aspects from treatment above  Pt is appropriate for continued PT to reach STGs and return to high PLOF  Barriers to Discharge: Inaccessible home environment, Decreased caregiver support     PT Discharge Recommendation: Post acute rehabilitation services    See flowsheet documentation for full assessment

## 2023-06-26 NOTE — CASE MANAGEMENT
Case Management Assessment & Discharge Planning Note    Patient name Keith Barnacle  Location [facility] [id number] MRN [id number]  : 1965 Date 2023       Current Admission Date: 2023  Current Admission Diagnosis:Morbid obesity [facility])   Patient Active Problem List    Diagnosis Date Noted   • Ambulatory dysfunction 2023   • Knee pain 2023   • HTN (hypertension), benign    • Hyperlipidemia    • Morbid obesity ([address] )       LOS (days): 1  Geometric Mean LOS (GMLOS) (days):   Days to GMLOS:     OBJECTIVE:    Risk of Unplanned Readmission Score: 5 91         Current admission status: Inpatient       Preferred Pharmacy:   [address] [place] [id number]  Phone: [phone] Fax: [phone]    Primary Care Provider: No primary care provider on file  Primary Insurance: [id number]   [email or web address] [facility]  Secondary Insurance:     ASSESSMENT:  Oskar Otto Proxies    There are no active Health Care Proxies on file  Advance Directives  Does patient have a [address]?: No  Was patient offered paperwork?: Yes (CM supplied info)  Does patient currently have a Health Care decision maker?: Yes, please see Health Care Proxy section  Does patient have Advance Directives?: No  Was patient offered paperwork?: Yes (CM supplied info)  Primary Contact:  Devi Rai and niece Kiana Osuna         Readmission Root Cause  30 Day Readmission: No    Patient Information  Admitted from[de-identified] Home  Mental Status: Alert  During Assessment patient was accompanied by: Other-Comment (sister in law Joe Goldmann)  Assessment information provided by[de-identified] Patient  Primary Caregiver: Self  Caregiver's Name[de-identified]  Tram Nose Relationship to Patient[de-identified] Family Member  Caregiver's Telephone Number[de-identified] on [facility]: Spouse/significant other  [place] of Residence: [place] [id number] do you live in?: [address] [place] entry access options   Select all that apply : Stairs  Number of steps to enter home : 4  Do the steps have railings?: Yes  Type of Current Residence: [place]  In the last 12 months, was there a time when you were not able to pay the mortgage or rent on time?: No  In the last 12 months, how many places have you lived?: 1  In the last 12 months, was there a time when you did not have a steady place to sleep or slept in a shelter (including now)?: No  Homeless/housing insecurity resource given?: No  Living Arrangements: Lives w/ Spouse/significant other  Is patient a ?: No    Activities of Daily Living Prior to Admission  Functional Status: Independent  Completes ADLs independently?: Yes (Pt cleans herself with wipes-is unable to get into the tub)  Ambulates independently?: Yes (Pt is able to ambualte in the home for short distances, but has not left the home in 6 months)  Does patient use assisted devices?: Yes  Assisted Devices (DME) used: [employer]  Does patient currently own DME?: Yes  What DME does the patient currently own?: Shower Chair, Straight Cane, Walker  Does patient have a history of Outpatient Therapy (PT/OT)?: Yes  Does the patient have a history of Short-Term Rehab?: No  Does patient have a history of [facility]?: No  Does patient currently have [place] [id number]?: No         Patient Information Continued  Income Source: Unemployed  Does patient have prescription coverage?: Yes  Within the past 12 months, you worried that your food would run out before you got the money to buy more : Never true  Within the past 12 months, the food you bought just didn't last and you didn't have money to get more : Never true  Food insecurity resource given?: No  Does patient receive dialysis treatments?: No  Does patient have a history of substance abuse?: No  Does patient have a history of Mental Health Diagnosis?: No    PHQ 2/9 Screening   Reviewed PHQ 2/9 Depression Screening Score?: No    Means of Transportation  Means of Transport to Appts[de-identified] Family transport  In the past 12 months, has lack of transportation kept you from medical appointments or from getting medications?: No  In the past 12 months, has lack of transportation kept you from meetings, work, or from getting things needed for daily living?: No  Was application for public transport provided?: No        DISCHARGE DETAILS:    Discharge planning discussed with[de-identified] patient and dil Graham Song (pt gives permission to speak in front of Graham Song)  Freedom of Choice: Yes  Comments - Freedom of Choice: CM discussed PT's recommendation of rehab and all options explained  Pt is unsure at this time of dcp, but will allow referrals to acute rehabs, STRs, and [facility]  CM will review acceptances with pt for final decison  CM contacted family/caregiver?: Yes  Were Treatment Team discharge recommendations reviewed with patient/caregiver?: Yes  Did patient/caregiver verbalize understanding of patient care needs?: Yes  Were patient/caregiver advised of the risks associated with not following Treatment Team discharge recommendations?: Yes    Contacts  Patient Contacts: Graham Song  Relationship to Patient[de-identified] Family  Contact Method:  In Person  Reason/Outcome: Continuity of Care, Discharge [address]         Is the patient interested in [facility] at discharge?: Yes  [address] requested[de-identified] [address], Nursing, Occupational Therapy, Physical [address] [facility] Provider[de-identified] PCP  Home Health Services Needed[de-identified] Evaluate Functional Status and Safety, Gait/ADL Training, Strengthening/Theraputic Exercises to Improve Function  Homebound Criteria Met[de-identified] Requires the Assistance of Another Person for Safe Ambulation or to Leave the Home, Uses an Assist Device (i e  cane, walker, etc)  Supporting Clincal Findings[de-identified] Fatigues Easliy in [employer], Limited Endurance    DME Referral Provided  Referral made for DME?: No    Other Referral/Resources/Interventions Provided:  Referral Comments: Referrals will be made to acute, STR, and HHC  CM to review acceptances with pt for final decision      Would you like to participate in our [address] service program?  : No - Declined    Treatment Team Recommendation: Short Term Rehab     Transport at Discharge : Wheelchair Michelle dos santos

## 2023-06-26 NOTE — PROGRESS NOTES
[address] [place] [address]  Progress Note  Name: Annalee Cano  MRN: [id number]  Unit/Bed#: -01 I Date of Admission: 6/24/2023   Date of Service: 6/26/2023 I Hospital Day: 1    Assessment/Plan   * Morbid obesity ([address] )  Assessment & Plan  Body mass index is 75 56 kg/m²  · Encourage weight loss, dietary modifications   · Patient received 2 doses of Lasix    Knee pain  Assessment & Plan  Came in c/o knee pain with ambulatory dysfunction  · Suspicion for DVT with the way the patient describing her pain, could be a ruptured Baker's cyst  · XR knee is negative for acute findings  · Venous duplex is negative for acute or chronic DVTs  · PT/OT evaluations  · CM for discharge planning, likely STR     HTN (hypertension), benign  Assessment & Plan  · Continue home medications, cervedilol 12 5 mg BID, losartan-HCTZ 25/100 daily  · Blood Pressure: (!) 171/97   · Increased BB to 25 mg BID     Ambulatory dysfunction  Assessment & Plan  · Multifactorial, morbid obesity and osteoarthritis playing a role   · PT/OT for discharge recommendations  · STR            VTE Pharmacologic Prophylaxis: VTE Score: 5 High Risk (Score >/= 5) - Pharmacological DVT Prophylaxis Ordered: enoxaparin (Lovenox)  Sequential Compression Devices Ordered  Patient Centered Rounds: I performed bedside rounds with nursing staff today  Discussions with Specialists or Other Care Team Provider: LAITH     Education and Discussions with Family / Patient: Updated  (mother) at bedside  Total Time Spent on Date of Encounter in care of patient: 35 minutes This time was spent on one or more of the following: performing physical exam; counseling and coordination of care; obtaining or reviewing history; documenting in the medical record; reviewing/ordering tests, medications or procedures; communicating with other healthcare professionals and discussing with patient's family/caregivers      Current Length of Stay: 1 day(s)  Current Patient Status: Inpatient   Certification Statement: The patient will continue to require additional inpatient hospital stay due to pending STR placement and BP improvement  Discharge Plan: Anticipate discharge later today or tomorrow to rehab facility  Code Status: Level 1 - Full Code    Subjective:   Patient seen sitting up in bed, doing well  Pain is reasonable at this time  Reviewed ultrasound prelim results and XR results  At this time, given her relative inactivity, would advised PT/OT trial and topical/oral pain relief as needed  If not better, can then obtain MRI vs CT scan of the knee for further evaluation  Objective:     Vitals:   Temp (24hrs), Av 3 °F (36 8 °C), Min:98 °F (36 7 °C), Max:98 5 °F (36 9 °C)    Temp:  [98 °F (36 7 °C)-98 5 °F (36 9 °C)] 98 °F (36 7 °C)  HR:  [81-95] 84  Resp:  [18] 18  BP: (147-177)/(91-97) 171/97  SpO2:  [93 %-95 %] 93 %  Body mass index is 75 56 kg/m²  Input and Output Summary (last 24 hours): Intake/Output Summary (Last 24 hours) at 2023 0919  Last data filed at 2023 0818  Gross per 24 hour   Intake 240 ml   Output 5050 ml   Net -4810 ml       Physical Exam:   Physical Exam  Vitals and nursing note reviewed  Constitutional:       General: She is not in acute distress  Appearance: She is morbidly obese  She is not ill-appearing or toxic-appearing  Cardiovascular:      Rate and Rhythm: Normal rate  Pulmonary:      Effort: Pulmonary effort is normal  No respiratory distress  Musculoskeletal:      Right lower leg: Edema present  Left lower leg: Edema present  Skin:     General: Skin is warm and dry  Coloration: Skin is not pale  Neurological:      Mental Status: She is alert and oriented to person, place, and time     Psychiatric:         Mood and Affect: Mood normal          Behavior: Behavior normal           Additional Data:     Labs:  Results from last 7 days   Lab Units 23  0454 23  9328 06/24/23 2035   WBC Thousand/uL 9 55  --  10 46*   HEMOGLOBIN g/dL 12 6  --  12 6   HEMATOCRIT % 39 6  --  39 7   PLATELETS Thousands/uL 268   < > 257   NEUTROS PCT %  --   --  69   LYMPHS PCT %  --   --  18   MONOS PCT %  --   --  10   EOS PCT %  --   --  1    < > = values in this interval not displayed  Results from last 7 days   Lab Units 06/26/23  0454 06/25/23  0524 06/24/23 2035   SODIUM mmol/L 140   < > 140   POTASSIUM mmol/L 3 9   < > 3 7   CHLORIDE mmol/L 101   < > 102   CO2 mmol/L 30   < > 29   BUN mg/dL 25   < > 23   CREATININE mg/dL 0 91   < > 0 93   ANION GAP mmol/L 9   < > 9   CALCIUM mg/dL 9 5   < > 10 0   ALBUMIN g/dL  --   --  4 2   TOTAL BILIRUBIN mg/dL  --   --  0 53   ALK PHOS U/L  --   --  114*   ALT U/L  --   --  21   AST U/L  --   --  26   GLUCOSE RANDOM mg/dL 103   < > 100    < > = values in this interval not displayed                         Lines/Drains:  Invasive Devices     Peripheral Intravenous Line  Duration           Peripheral IV 06/25/23 Distal;Right;Upper;Ventral (anterior) Arm <1 day                      Imaging: Reviewed radiology reports from this admission including: XR knee, venous duplex     Recent Cultures (last 7 days):         Last 24 Hours Medication List:   Current Facility-Administered Medications   Medication Dose Route Frequency Provider Last Rate   • acetaminophen  975 mg Oral Q8H [address] Juan Daniel Bob MD     • atorvastatin  20 mg Oral QPM Juan Daniel Bob MD     • carvedilol  12 5 mg Oral Once Nell Cazares PA-C     • carvedilol  25 mg Oral BID With Meals Renay Rascon PA-C     • celecoxib  200 mg Oral BID Dorothy Lundberg PA-C     • Diclofenac Sodium  4 g Topical 4x Daily Nell Cazares PA-C     • enoxaparin  40 mg Subcutaneous BID Juan Daniel Bob MD     • fluticasone  1 spray Each Nare Daily Dorothy Lundberg PA-C     • hydrALAZINE  10 mg Intravenous Q4H PRN Juan Daniel Bob MD     • losartan  100 mg Oral Daily Juan Daniel Brar Smiley Alvarado MD      And   • hydrochlorothiazide  25 mg Oral Daily Nitin Don Bloch, MD     • magnesium Oxide  400 mg Oral BID Clyde Feng DO     • melatonin  9 mg Oral HS PRN Maria Luisa Wheeler PA-C     • morphine injection  2 mg Intravenous Q4H PRN Nitin Don Bloch, MD     • One-A-Day Menopause Formula  1 tablet Oral Daily Dorothy Lundberg PA-C     • oxyCODONE  10 mg Oral Q4H PRN Nitin Don Bloch, MD     • oxyCODONE  5 mg Oral Q4H PRN Nitin Don Bloch, MD     • pantoprazole  40 mg Oral Early Morning Nitin Don Bloch, MD          Today, Patient Was Seen By: Miriam Sanchez PA-C    **Please Note: This note may have been constructed using a voice recognition system  **

## 2023-06-26 NOTE — ASSESSMENT & PLAN NOTE
· Continue home medications, cervedilol 12 5 mg BID, losartan-HCTZ 25/100 daily  · Blood Pressure: (!) 171/97   · Increased BB to 25 mg BID

## 2023-06-26 NOTE — ASSESSMENT & PLAN NOTE
· Multifactorial, morbid obesity and osteoarthritis playing a role   · PT/OT for discharge recommendations  · STR

## 2023-06-26 NOTE — OCCUPATIONAL THERAPY NOTE
Occupational Therapy Treatment Note     Patient Name: Angelita Radford  TFGHN'W Date: 6/26/2023  Problem List  Principal Problem: Morbid obesity ([address] )  Active Problems:    Knee pain    HTN (hypertension), benign    Ambulatory dysfunction          06/26/23 1300   OT Last Visit   OT Visit Date 06/26/23   Note Type   Note Type Treatment   Pain Assessment   Pain Assessment Tool 0-10   Pain Score 5  (at rest)   Pain Location/Orientation Orientation: Left; Location: Hip  (& R knee)   Pain Onset/Description Onset: Ongoing;Frequency: Constant/Continuous   Hospital Pain Intervention(s) Ambulation/increased activity;Repositioned;Medication (See MAR)   Restrictions/Precautions   Weight Bearing Precautions Per Order No   Other Precautions Fall Risk;Pain   Lifestyle   Autonomy Patient reporting being independent with ADLs/IADLs, ambulatory with rollator, and lives with    Reciprocal Relationships , DIL, and niece   Service to Others On disability   Intrinsic Gratification Reading   ADL   Where Assessed Commode   Toileting Assistance  1  Total Assistance   Toileting Deficit Setup;Steadying;Verbal cueing;Supervison/safety; Increased time to complete; Bedside commode;Perineal hygiene   Functional Standing Tolerance   Time ~45-60 seconds each   Activity Pt completed 4 STS transfers from EOB  Pt tolerated standing for ~1 minute each standing trial  Pt utilizs RW for stability and noted to have flexed trunk in standing  Bed Mobility   Supine to Sit 3  Moderate assistance   Additional items Assist x 2;HOB elevated; Bedrails; Increased time required;Verbal cues;LE management   Sit to Supine 3  Moderate assistance   Additional items Assist x 2;HOB elevated; Bedrails; Increased time required;Verbal cues;LE management   Transfers   Sit to Stand 3  Moderate assistance   Additional items Assist x 2; Increased time required;Verbal cues   Stand to Sit 3  Moderate assistance   Additional items Assist x 2; Increased time required;Verbal cues   Toilet transfer 4  Minimal assistance   Additional items Assist x 2; Increased time required;Verbal cues; Commode   Toilet Transfers   Toilet Transfer From [employer] Transfer Type To and from   Toilet Transfer to Extra wide bedside commode   Toilet Transfer Technique   Hindu-Worton swap utilized to move commode behind pt)   Toilet Transfers Minimal assistance  (Assist of 2)   Cognition   Overall Cognitive Status WFL   Arousal/Participation Alert; Responsive; Cooperative   Attention Within functional limits   Orientation Level Oriented X4   Memory Within functional limits   Following Commands Follows all commands and directions without difficulty   Comments Pt agreeable to OT session   Activity Tolerance   Activity Tolerance Patient limited by pain   Medical Staff Made Aware This session, pt required and most appropriately benefited from skilled OT/PT co-treat due to extensive physical assistance of two therapists, significant regression from functional baseline and decreased activity tolerance  OT and PT goals were addressed separately as seen in documentation  Assessment   Assessment Patient participated in Skilled OT session this date with interventions consisting of ADL re training with the use of correct body mechnaics,  therapeutic activities to: increase activity tolerance and increase dynamic sit/ stand balance during functional activity    Patient agreeable to OT treatment session, upon arrival patient was found supine in bed and in no apparent distress  Patient completed bed mobility with mod assist of 2 and functional transfers with mod assist of 2  Pt completed 4 STS transfers from EOB and tolerated standing ~40-60 seconds  While in standing, bed>commode swap completed to transfer pt OOB to commode  Pt required total assist for toilet hygiene and min assist for toilet transfer  In comparison to previous session, patient with improvements in standing tolerance   Patient requiring verbal cues for correct technique, one step directives and frequent rest periods  Patient continues to be functioning below baseline level, occupational performance remains limited secondary to factors listed above and increased risk for falls and injury  From OT standpoint, recommendation at time of d/c would be Post acute rehabilitation services  Patient to benefit from continued Occupational Therapy treatment while in the hospital to address deficits as defined above and maximize level of functional independence with ADLs and functional mobility  Plan   Treatment Interventions ADL retraining;Functional transfer training; Endurance training;Patient/family training   Goal Expiration Date 07/10/23   OT Treatment Day 1   OT Frequency 3-5x/wk   Recommendation   OT Discharge Recommendation Post acute rehabilitation services   AM-PAC Daily Activity Inpatient   Lower Body Dressing 1   Bathing 1   Toileting 1   Upper Body Dressing 3   Grooming 4   Eating 4   Daily Activity Raw Score 14   Daily Activity Standardized Score (Calc for Raw Score >=11) 33 39   AM-PAC Applied Cognition Inpatient   Following a Speech/Presentation 4   Understanding Ordinary Conversation 4   Taking Medications 4   Remembering Where Things Are Placed or Put Away 4   Remembering List of 4-5 Errands 4   Taking Care of Complicated Tasks 4   Applied Cognition Raw Score 24   Applied Cognition Standardized Score 62 21   Modified Woodward Scale   Modified Woodward Scale 4   End of Consult   Patient Position at End of Consult Supine;Bed/Chair alarm activated; All needs within reach   Nurse Communication Nurse aware of consult     Aemya Gold OTD, OTR/L

## 2023-06-26 NOTE — PHYSICAL THERAPY NOTE
Physical Therapy Treatment     06/26/23 1259   PT Last Visit   PT Visit Date 06/26/23   Note Type   Note Type Treatment   Pain Assessment   Pain Assessment Tool 0-10   Pain Score 5   Pain Location/Orientation Orientation: Left; Location: Hip;Orientation: Right;Location: Knee   Pain Onset/Description Onset: Ongoing;Frequency: Constant/Continuous   Hospital Pain Intervention(s) Ambulation/increased activity;Repositioned   Restrictions/Precautions   Weight Bearing Precautions Per Order No   Other Precautions Pain; Fall Risk  (bariatric)   General   Chart Reviewed Yes   Response to Previous Treatment Patient with no complaints from previous session  Family/Caregiver Present Yes   Cognition   Overall Cognitive Status WFL   Arousal/Participation Alert; Responsive; Cooperative   Attention Within functional limits   Orientation Level Oriented X4   Memory Within functional limits   Following Commands Follows all commands and directions without difficulty   Comments pt agreeable to PT treatment session   Bed Mobility   Supine to Sit 3  Moderate assistance   Additional items Assist x 2;HOB elevated; Bedrails; Increased time required;Verbal cues;LE management   Sit to Supine 3  Moderate assistance   Additional items Assist x 2;HOB elevated; Bedrails; Increased time required;Verbal cues;LE management   Transfers   Sit to Stand 3  Moderate assistance   Additional items Assist x 2; Increased time required;Verbal cues   Stand to Sit 3  Moderate assistance   Additional items Assist x 2; Increased time required;Verbal cues   Toilet transfer 4  Minimal assistance   Additional items Assist x 2; Increased time required;Verbal cues; Commode   Additional Comments pt performed 4 sit to stand transfers, vc for appropriate hand/foot placement  pt tolerated static standing 45-60sec each attempt with use of bariatric RW   Ambulation/Elevation   Gait pattern Improper Weight shift;Decreased foot clearance;Decreased R stance; Short stride; Excessively slow;Knees flexed   Gait Assistance 3  Moderate assist   Additional items Assist x 2   Assistive Device Bariatric Rolling walker   Distance weight shift with 1 step   Ambulation/Elevation Additional Comments achieved weight shift with 1 step ModAx2 with bariatric RW for UE support   Balance   Static Sitting Fair +   Dynamic Sitting Fair   Static Standing Poor   Dynamic Standing Poor   Ambulatory Poor -   Endurance Deficit   Endurance Deficit Yes   Endurance Deficit Description decreased activity tolerance   Activity Tolerance   Activity Tolerance Patient limited by pain   Medical Staff Made Aware OT Yoselyn Lainez   Nurse Made Aware RN [facility]   Exercises   Knee AROM Long Arc Quad Sitting;10 reps;Bilateral;AROM   Ankle Pumps Sitting;10 reps;Bilateral;AROM   Assessment   Prognosis Good   Problem List Decreased strength;Decreased endurance; Impaired balance;Decreased mobility;Obesity;Pain   Assessment Pt seen for PT treatment today with a focus on ther act and ther ex to facilitate return to PLOF and promote functional strengthening  Ther act consisted of bed mobility, sit<>stand transfers; as well as weight shifting to progress towards gait training for level surface ambulation with RW management, and stair negotiation  Therapeutic exercise consisted of seated LAQ and ankle pumps to prepare the pt for weight bearing  Since previous session, pt has demonstrated good progress in terms of performing 4 sit<>stands, standing for 45-60 sec, and performing weight shifting with 1 step requiring ModAx2 and RW for UE support  Education provided on foot placement to unweight the painful LE in order to reduce pain with weight bearing with pt demonstrating and verbalizing understanding  Pt is progressing towards their goals pending approval from a medical standpoint  Prognosis is good due to the listed aspects from treatment above  Pt is appropriate for continued PT to reach STGs and return to high PLOF     Barriers to Discharge Inaccessible home environment;Decreased caregiver support   Goals   STG Expiration Date 07/05/23   Short Term Goal #1 In 10 days: Increase bilateral LE strength 1/2 grade to facilitate independent mobility, Perform all bed mobility tasks with min A of 1 to decrease caregiver burden, Perform all transfers with min A of 1 to improve independence, Increase all balance 1/2 grade to decrease risk for falls and PT to see and establish goals for gait when appropriate   PT Treatment Day 2   Plan   Treatment/Interventions Functional transfer training;LE strengthening/ROM; Therapeutic exercise; Endurance training;Patient/family training;Bed mobility;Continued evaluation;Spoke to nursing;OT;Family; Equipment eval/education   Progress Progressing toward goals   PT Frequency 3-5x/wk   Recommendation   PT Discharge Recommendation Post acute rehabilitation services   AM-PAC Basic Mobility Inpatient   Turning in Flat Bed Without Bedrails 2   Lying on Back to Sitting on Edge of Flat Bed Without Bedrails 2   Moving Bed to Chair 1   Standing Up From Chair Using Arms 1   Walk in Room 1   Climb 3-5 Stairs With Railing 1   Basic Mobility Inpatient Raw Score 8   Turning Head Towards Sound 4   Follow Simple Instructions 4   Low Function Basic Mobility Raw Score  16   Low Function Basic Mobility Standardized Score  25 72   Highest Level Of Mobility   JH-HLM Goal 3: Sit at edge of bed   JH-HLM Achieved 4: Move to chair/commode   Education   Education Provided Mobility training;Home exercise program;Assistive device   Patient Demonstrates acceptance/verbal understanding   End of Consult   Patient Position at End of Consult Supine; All needs within reach   Signed by DELICIA Castillo

## 2023-06-26 NOTE — ASSESSMENT & PLAN NOTE
Came in c/o knee pain with ambulatory dysfunction  · Suspicion for DVT with the way the patient describing her pain, could be a ruptured Baker's cyst  · XR knee is negative for acute findings  · Venous duplex is negative for acute or chronic DVTs  · PT/OT evaluations  · CM for discharge planning, likely STR

## 2023-06-26 NOTE — PLAN OF CARE
Problem: OCCUPATIONAL THERAPY ADULT  Goal: Performs self-care activities at highest level of function for planned discharge setting  See evaluation for individualized goals  Description: Treatment Interventions: ADL retraining, Functional transfer training, Endurance training, Patient/family training          See flowsheet documentation for full assessment, interventions and recommendations  Outcome: Progressing  Note: Limitation: Decreased ADL status, Decreased UE strength, Decreased endurance, Decreased self-care trans, Decreased high-level ADLs  Prognosis: Good  Assessment: Patient participated in Skilled OT session this date with interventions consisting of ADL re training with the use of correct body mechnaics,  therapeutic activities to: increase activity tolerance and increase dynamic sit/ stand balance during functional activity    Patient agreeable to OT treatment session, upon arrival patient was found supine in bed and in no apparent distress  Patient completed bed mobility with mod assist of 2 and functional transfers with mod assist of 2  Pt completed 4 STS transfers from EOB and tolerated standing ~40-60 seconds  While in standing, bed>commode swap completed to transfer pt OOB to commode  Pt required total assist for toilet hygiene and min assist for toilet transfer  In comparison to previous session, patient with improvements in standing tolerance  Patient requiring verbal cues for correct technique, one step directives and frequent rest periods  Patient continues to be functioning below baseline level, occupational performance remains limited secondary to factors listed above and increased risk for falls and injury  From OT standpoint, recommendation at time of d/c would be Post acute rehabilitation services     Patient to benefit from continued Occupational Therapy treatment while in the hospital to address deficits as defined above and maximize level of functional independence with ADLs and functional mobility       OT Discharge Recommendation: Post acute rehabilitation services        [facility] OTD, OTR/L

## 2023-06-26 NOTE — NURSING NOTE
Patient's IV occluded  IV access removed  Patient does not wish to have an additional IV placed at this time  Patient does not receive scheduled IV medications  Provider informed

## 2023-06-27 PROCEDURE — 99239 HOSP IP/OBS DSCHRG MGMT >30: CPT | Performed by: NURSE PRACTITIONER

## 2023-06-27 PROCEDURE — 97110 THERAPEUTIC EXERCISES: CPT

## 2023-06-27 PROCEDURE — NC001 PR NO CHARGE: Performed by: PHYSICIAN ASSISTANT

## 2023-06-27 PROCEDURE — 97116 GAIT TRAINING THERAPY: CPT

## 2023-06-27 RX ADMIN — Medication 400 MG: at 09:33

## 2023-06-27 RX ADMIN — FLUTICASONE PROPIONATE 1 SPRAY: 50 SPRAY, METERED NASAL at 09:36

## 2023-06-27 RX ADMIN — CARVEDILOL 25 MG: 12.5 TABLET, FILM COATED ORAL at 17:20

## 2023-06-27 RX ADMIN — CELECOXIB 200 MG: 100 CAPSULE ORAL at 09:33

## 2023-06-27 RX ADMIN — DICLOFENAC SODIUM 4 G: 10 GEL TOPICAL at 21:39

## 2023-06-27 RX ADMIN — ENOXAPARIN SODIUM 40 MG: 40 INJECTION SUBCUTANEOUS at 17:20

## 2023-06-27 RX ADMIN — ACETAMINOPHEN 975 MG: 325 TABLET, FILM COATED ORAL at 21:39

## 2023-06-27 RX ADMIN — CARVEDILOL 25 MG: 12.5 TABLET, FILM COATED ORAL at 09:33

## 2023-06-27 RX ADMIN — ENOXAPARIN SODIUM 40 MG: 40 INJECTION SUBCUTANEOUS at 09:33

## 2023-06-27 RX ADMIN — DICLOFENAC SODIUM 4 G: 10 GEL TOPICAL at 17:23

## 2023-06-27 RX ADMIN — OXYCODONE HYDROCHLORIDE 5 MG: 5 TABLET ORAL at 17:26

## 2023-06-27 RX ADMIN — HYDROCHLOROTHIAZIDE 25 MG: 25 TABLET ORAL at 09:33

## 2023-06-27 RX ADMIN — CELECOXIB 200 MG: 100 CAPSULE ORAL at 17:20

## 2023-06-27 RX ADMIN — OXYCODONE HYDROCHLORIDE 10 MG: 10 TABLET ORAL at 12:59

## 2023-06-27 RX ADMIN — ACETAMINOPHEN 975 MG: 325 TABLET, FILM COATED ORAL at 06:14

## 2023-06-27 RX ADMIN — DICLOFENAC SODIUM 4 G: 10 GEL TOPICAL at 09:36

## 2023-06-27 RX ADMIN — ATORVASTATIN CALCIUM 20 MG: 40 TABLET, FILM COATED ORAL at 17:20

## 2023-06-27 RX ADMIN — PANTOPRAZOLE SODIUM 40 MG: 40 TABLET, DELAYED RELEASE ORAL at 06:14

## 2023-06-27 RX ADMIN — OXYCODONE HYDROCHLORIDE 10 MG: 10 TABLET ORAL at 06:17

## 2023-06-27 RX ADMIN — OXYCODONE HYDROCHLORIDE 10 MG: 10 TABLET ORAL at 21:39

## 2023-06-27 RX ADMIN — Medication 1 TABLET: at 21:39

## 2023-06-27 RX ADMIN — ACETAMINOPHEN 975 MG: 325 TABLET, FILM COATED ORAL at 13:00

## 2023-06-27 RX ADMIN — Medication 400 MG: at 17:20

## 2023-06-27 RX ADMIN — LOSARTAN POTASSIUM 100 MG: 50 TABLET, FILM COATED ORAL at 09:33

## 2023-06-27 NOTE — ASSESSMENT & PLAN NOTE
· Multifactorial, morbid obesity and osteoarthritis playing a role   · PT/OT for discharge recommendations  · STR-refusing discharge home with services

## 2023-06-27 NOTE — PHYSICAL THERAPY NOTE
"   PT Progress Note (32min)  (12:50-13:22)       06/27/23 1322   PT Last Visit   PT Visit Date 06/27/23   Note Type   Note Type Treatment   Pain Assessment   Pain Assessment Tool 0-10   Pain Score 5   Pain Location/Orientation Orientation: Right;Location: Knee   Hospital Pain Intervention(s) Ambulation/increased activity;Repositioned   Restrictions/Precautions   Weight Bearing Precautions Per Order No   Other Precautions Telemetry; Fall Risk;Pain;Bed Alarm; Chair Alarm   General   Chart Reviewed Yes   Response to Previous Treatment Patient with no complaints from previous session  Family/Caregiver Present Yes   Cognition   Orientation Level Oriented X4   Subjective   Subjective \"I would like to try to take some steps today\"  Bed Mobility   Supine to Sit 3  Moderate assistance   Additional items Assist x 1;HOB elevated; Bedrails; Increased time required;Verbal cues;LE management   Transfers   Sit to Stand 3  Moderate assistance   Additional items Assist x 1;Verbal cues; Increased time required   Stand to Sit 3  Moderate assistance   Additional items Assist x 1;Verbal cues; Increased time required   Ambulation/Elevation   Gait pattern Wide PHILLIP; Decreased foot clearance   Gait Assistance 3  Moderate assist   Additional items Assist x 1;Verbal cues   Assistive Device Bariatric Rolling walker   Distance 3'   Stair Management Assistance Not tested   Balance   Static Sitting Fair +   Dynamic Sitting Fair   Static Standing Poor +   Dynamic Standing Poor   Ambulatory Poor   Endurance Deficit   Endurance Deficit Yes   Activity Tolerance   Activity Tolerance Patient limited by fatigue;Patient limited by pain   Nurse Made Aware ROSE MARIE Benton   Exercises   Quad Sets Supine;10 reps;AROM; Bilateral   Heelslides Supine;5 reps;AROM; Bilateral   Hip Abduction Supine;5 reps;AROM; Bilateral;AAROM  (AAROM L LE)   Ankle Pumps Supine;10 reps;AROM; Bilateral   Assessment   Prognosis Good   Problem List Decreased strength;Decreased endurance; Impaired " "balance;Decreased mobility;Obesity;Pain   Assessment pt demonstrating progress c PT  performed all functional mobility tasks mod (A)x1 c increased time  able to initiate ambulation at bedside c jeremy RW  ambulated 3' laterally c cues for wt shifting + sequencing  performed supine A/AROM ther ex B/L LE x5-10 reps c min verbal cues for technique  pt remained seated EOB at end of session c all needs in reach  cont skilled PT recommended to further maximize functional mobility + improve quality of life  AM-PAC raw score 11 indicating pt would benefit from inpt skilled PT, however please refer to PT d/c recommendation for safe d/c planning  Barriers to Discharge Inaccessible home environment;Decreased caregiver support   Goals   Patient Goals \"to get outside on my deck\"  STG Expiration Date 07/05/23   Short Term Goal #1 in addition to PT goals: 1  ambulate 15' min (A)x1 c RW to safely navigate home environment   PT Treatment Day 3   Plan   Treatment/Interventions Functional transfer training;LE strengthening/ROM; Therapeutic exercise; Endurance training;Patient/family training;Equipment eval/education; Bed mobility;Gait training;Spoke to nursing;Family   Progress Progressing toward goals   PT Frequency 3-5x/wk   Recommendation   PT Discharge Recommendation Post acute rehabilitation services   AM-PAC Basic Mobility Inpatient   Turning in Flat Bed Without Bedrails 3   Lying on Back to Sitting on Edge of Flat Bed Without Bedrails 2   Moving Bed to Chair 2   Standing Up From Chair Using Arms 2   Walk in Room 1   Climb 3-5 Stairs With Railing 1   Basic Mobility Inpatient Raw Score 11   Basic Mobility Standardized Score 30 25   Turning Head Towards Sound 4   Follow Simple Instructions 4   Low Function Basic Mobility Raw Score  19   Low Function Basic Mobility Standardized Score  31 06   Highest Level Of Mobility   -HL Goal 4: Move to chair/commode   -HL Achieved 4: Move to [employer] Provided " Mobility training;Home exercise program;Assistive device   Patient Demonstrates acceptance/verbal understanding   End of Consult   Patient Position at End of Consult All needs within reach; Seated edge of bed     Klaus Otero DPT

## 2023-06-27 NOTE — PLAN OF CARE
Problem: PHYSICAL THERAPY ADULT  Goal: Performs mobility at highest level of function for planned discharge setting  See evaluation for individualized goals  Description: Treatment/Interventions: Functional transfer training, LE strengthening/ROM, Therapeutic exercise, Endurance training, Patient/family training, Bed mobility, Continued evaluation, Spoke to nursing, OT, Family          See flowsheet documentation for full assessment, interventions and recommendations  Outcome: Progressing  Note: Prognosis: Good  Problem List: Decreased strength, Decreased endurance, Impaired balance, Decreased mobility, Obesity, Pain  Assessment: pt demonstrating progress c PT  performed all functional mobility tasks mod (A)x1 c increased time  able to initiate ambulation at bedside c jeremy RW  ambulated 3' laterally c cues for wt shifting + sequencing  performed supine A/AROM ther ex B/L LE x5-10 reps c min verbal cues for technique  pt remained seated EOB at end of session c all needs in reach  cont skilled PT recommended to further maximize functional mobility + improve quality of life  AM-PAC raw score 11 indicating pt would benefit from inpt skilled PT, however please refer to PT d/c recommendation for safe d/c planning  Barriers to Discharge: Inaccessible home environment, Decreased caregiver support     PT Discharge Recommendation: Post acute rehabilitation services    See flowsheet documentation for full assessment

## 2023-06-27 NOTE — CASE MANAGEMENT
Case Management Discharge Planning Note    Patient name Anthony Torres  Location [id number] MRN [id number]  : 1965 Date 2023       Current Admission Date: 2023  Current Admission Diagnosis:Morbid obesity [facility])   Patient Active Problem List    Diagnosis Date Noted   • Ambulatory dysfunction 2023   • Knee pain 2023   • HTN (hypertension), benign    • Hyperlipidemia    • Morbid obesity ([address] )       LOS (days): 2  Geometric Mean LOS (GMLOS) (days): 2 70  Days to GMLOS:0 5     OBJECTIVE:  Risk of Unplanned Readmission Score: 5 97         Current admission status: Inpatient   Preferred Pharmacy:   [address] [place] [id number]  Phone: [phone] Fax: [phone]    Primary Care Provider: No primary care provider on file  Primary Insurance: [employer] [facility] REP  Secondary Insurance:     DISCHARGE DETAILS:                                          Other Referral/Resources/Interventions Provided:  Referral Comments: Met w pt to discuss d/c plan;  pt deemed medically ready for STR if she agrees  Gave pt provider list for SNFs and [facility] printed from [address] [place] [address] for her review  No bed offers from acute rehab facilities  Kia Espinosa present  Pt reports she prefers to go home w home health but will review SNF information before making a final decision    Will f/u w pt in AM

## 2023-06-27 NOTE — PROGRESS NOTES
[address] [place] [address]  Progress Note  Name: Zack Guzman  MRN: [id number]  Unit/Bed#: -01 I Date of Admission: 6/24/2023   Date of Service: 6/27/2023 I Hospital Day: 2    Assessment/Plan   * Morbid obesity ([address] )  Assessment & Plan  Body mass index is 75 56 kg/m²  · Encourage weight loss, dietary modifications     Knee pain  Assessment & Plan  Came in c/o knee pain with ambulatory dysfunction  · Suspicion for DVT vs ruptured Baker's cyst; venous duplex reassuringly negative   · XR knee is negative for acute findings  · PT/OT evaluations  · CM for discharge planning, likely STR (vs home with home care)  · Will need outpatient orthopedics follow up, if still with pain/ambulatory issues, will need CT vs MRI of the knee; can also consider outpatient corticosteroid injection    HTN (hypertension), benign  Assessment & Plan  · Continue home medications - cervedilol, losartan-HCTZ   · Blood Pressure: 111/68     Ambulatory dysfunction  Assessment & Plan  · Multifactorial, morbid obesity and osteoarthritis playing a role   · PT/OT for discharge recommendations  · STR          VTE Pharmacologic Prophylaxis: VTE Score: 5 High Risk (Score >/= 5) - Pharmacological DVT Prophylaxis Ordered: enoxaparin (Lovenox)  Sequential Compression Devices Ordered  Patient Centered Rounds: I performed bedside rounds with nursing staff today  Discussions with Specialists or Other Care Team Provider: LAITH     Education and Discussions with Family / Patient: Patient declined call to   Total Time Spent on Date of Encounter in care of patient: 35 minutes This time was spent on one or more of the following: performing physical exam; counseling and coordination of care; obtaining or reviewing history; documenting in the medical record; reviewing/ordering tests, medications or procedures; communicating with other healthcare professionals and discussing with patient's family/caregivers      Current Length of Stay: 2 day(s)  Current Patient Status: Inpatient   Certification Statement: The patient will continue to require additional inpatient hospital stay due to patient is awaiting STR placement; her BP has improved overnight  Discharge Plan: Anticipate discharge later today or tomorrow to rehab facility  Code Status: Level 1 - Full Code    Subjective:   Patient seen sitting OOB to the commode; reports improved pain and mobility, though her left hip is still not great  She denies new issues otherwise overnight  We discussed possibility of home with home health care vs STR; she is awaiting determination of acceptance prior to making that decision  Objective:     Vitals:   Temp (24hrs), Av °F (36 7 °C), Min:97 7 °F (36 5 °C), Max:98 1 °F (36 7 °C)    Temp:  [97 7 °F (36 5 °C)-98 1 °F (36 7 °C)] 98 1 °F (36 7 °C)  HR:  [73-88] 75  Resp:  [17] 17  BP: (102-158)/(67-81) 111/68  SpO2:  [91 %-95 %] 92 %  Body mass index is 75 56 kg/m²  Input and Output Summary (last 24 hours): Intake/Output Summary (Last 24 hours) at 2023 0950  Last data filed at 2023 6440  Gross per 24 hour   Intake 240 ml   Output --   Net 240 ml       Physical Exam:   Physical Exam  Vitals and nursing note reviewed  Constitutional:       General: She is not in acute distress  Appearance: She is morbidly obese  She is not toxic-appearing  Cardiovascular:      Rate and Rhythm: Normal rate  Pulmonary:      Effort: Pulmonary effort is normal  No respiratory distress  Skin:     Coloration: Skin is not pale  Neurological:      Mental Status: She is alert and oriented to person, place, and time  Psychiatric:         Mood and Affect: Mood normal          Behavior: Behavior normal  Behavior is cooperative            Additional Data:     Labs:  Results from last 7 days   Lab Units 23  0454 2324 23   WBC Thousand/uL 9 55  --  10 46*   HEMOGLOBIN g/dL 12 6  --  12 6   HEMATOCRIT % 39 6 --  39 7   PLATELETS Thousands/uL 268   < > 257   NEUTROS PCT %  --   --  69   LYMPHS PCT %  --   --  18   MONOS PCT %  --   --  10   EOS PCT %  --   --  1    < > = values in this interval not displayed  Results from last 7 days   Lab Units 06/26/23  0454 06/25/23  0524 06/24/23  2035   SODIUM mmol/L 140   < > 140   POTASSIUM mmol/L 3 9   < > 3 7   CHLORIDE mmol/L 101   < > 102   CO2 mmol/L 30   < > 29   BUN mg/dL 25   < > 23   CREATININE mg/dL 0 91   < > 0 93   ANION GAP mmol/L 9   < > 9   CALCIUM mg/dL 9 5   < > 10 0   ALBUMIN g/dL  --   --  4 2   TOTAL BILIRUBIN mg/dL  --   --  0 53   ALK PHOS U/L  --   --  114*   ALT U/L  --   --  21   AST U/L  --   --  26   GLUCOSE RANDOM mg/dL 103   < > 100    < > = values in this interval not displayed  Lines/Drains:  Invasive Devices     None                 Imaging: No pertinent imaging reviewed      Recent Cultures (last 7 days):         Last 24 Hours Medication List:   Current Facility-Administered Medications   Medication Dose Route Frequency Provider Last Rate   • acetaminophen  975 mg Oral Q8H [facility] Nitin Don Bloch, MD     • atorvastatin  20 mg Oral QPM Nitin Don Bloch, MD     • carvedilol  25 mg Oral BID With Meals Miriam Sanchez PA-C     • celecoxib  200 mg Oral BID Dorothy Lundberg PA-C     • Diclofenac Sodium  4 g Topical 4x Daily Nell Cazares PA-C     • enoxaparin  40 mg Subcutaneous BID Nitin Don Bloch, MD     • fluticasone  1 spray Each Nare Daily Dorothy Lundberg PA-C     • hydrALAZINE  10 mg Intravenous Q4H PRN Nitin Don Bloch, MD     • losartan  100 mg Oral Daily Nitin Don Bloch, MD      And   • hydrochlorothiazide  25 mg Oral Daily Nitin Don Bloch, MD     • magnesium Oxide  400 mg Oral BID Clyde Feng DO     • melatonin  9 mg Oral HS PRN Maria Luisa Wheeler PAJocelyneC     • morphine injection  2 mg Intravenous Q4H PRN Nitin Don Bloch, MD     • One-A-Day Menopause Formula  1 tablet Oral Daily formerly [facility] Ubaldo Torres PA-C     • oxyCODONE  10 mg Oral Q4H PRN Juan Daniel Live MD     • oxyCODONE  5 mg Oral Q4H PRN Juan Daniel Live MD     • pantoprazole  40 mg Oral Early Morning Juan Daniel Live MD          Today, Patient Was Seen By: Erin Crawford PA-C    **Please Note: This note may have been constructed using a voice recognition system  **

## 2023-06-27 NOTE — ASSESSMENT & PLAN NOTE
Came in c/o knee pain with ambulatory dysfunction  · Suspicion for DVT vs ruptured Baker's cyst; venous duplex reassuringly negative   · XR knee is negative for acute findings  · PT/OT is recommending short-term rehab, patient wishes to go home with home PT  · CM for discharge planning, refusing short-term rehab will need outpatient services  · Will need outpatient orthopedics follow up, if still with pain/ambulatory issues, will need CT vs MRI of the knee; can also consider outpatient corticosteroid injection

## 2023-06-27 NOTE — DISCHARGE SUMMARY
[facility]  [facility] 1965, 56 [facility]  female MRN: [id number]  Unit/Bed#: -01 Encounter: [id number]  Primary Care Provider: No primary care provider on file  Date and time admitted to hospital: 6/24/2023  6:32 PM    Knee pain  Assessment & Plan  Came in c/o knee pain with ambulatory dysfunction  · Suspicion for DVT vs ruptured Baker's cyst; venous duplex reassuringly negative   · XR knee is negative for acute findings  · PT/OT is recommending short-term rehab, patient wishes to go home with home PT  · CM for discharge planning, refusing short-term rehab will need outpatient services  · Will need outpatient orthopedics follow up, if still with pain/ambulatory issues, will need CT vs MRI of the knee; can also consider outpatient corticosteroid injection    Ambulatory dysfunction  Assessment & Plan  · Multifactorial, morbid obesity and osteoarthritis playing a role   · PT/OT for discharge recommendations  · STR-refusing discharge home with services    HTN (hypertension), benign  Assessment & Plan  · Continue home medications - cervedilol, losartan-HCTZ   · Blood Pressure: 111/68     * Morbid obesity ([facility] [address] )  Assessment & Plan  Body mass index is 75 56 kg/m²  · Encourage weight loss, dietary modifications     Medical Problems     Resolved Problems  Date Reviewed: 6/27/2023   None       Discharging Physician / Practitioner: ZEKE Joyce   PCP: No primary care provider on file    Admission Date:   Admission Orders (From admission, onward)     Ordered        06/25/23 1441  Inpatient Admission  Once            06/24/23 2148  Place in Observation  Once                      Discharge Date: 06/28/23     Consultations During Hospital Stay:  · IP CONSULT TO CASE MANAGEMENT   · PT, OT    Procedures Performed:   · None     Significant Findings / Test Results:   · XR right knee: no acute findings  · Venous duplex: no acute or chronic findings   · Hypokalemia, "resolved  · D-dimer 0 72     Incidental Findings:   · None      Test Results Pending at Discharge (will require follow up): · None      Outpatient Tests Requested:  · None     Complications:  None     Reason for Admission: ambulatory dysfunction; uncontrolled knee pain     Hospital Course:   Lorena River is a 56 y o  female patient who originally presented to the hospital on 6/24/2023 due to knee pain work-up was negative was evaluated by physical therapy was recommended short-term rehab refusing and wishes to go home with services    Please see above list of diagnoses and related plan for additional information  Condition at Discharge: stable    Discharge Day Visit / Exam:   Subjective: For now patient is refusing rehab will need transport services home denies any chest pain chest pain or shortness of breath or difficulty breathing resting in bed resting in bed  Vitals: Blood Pressure: 122/91 (06/28/23 0802)  Pulse: 73 (06/28/23 0802)  Temperature: 98 4 °F (36 9 °C) (06/28/23 0802)  Temp Source: Oral (06/26/23 1430)  Respirations: 16 (06/27/23 1531)  Height: 5' 1\" (154 9 cm) (06/24/23 2235)  Weight - Scale: (!) 181 kg (399 lb 14 6 oz) (06/24/23 2235)  SpO2: 90 % (06/28/23 0802)  Exam:   Physical Exam  Vitals and nursing note reviewed  Constitutional:       General: She is not in acute distress  Appearance: She is obese  She is ill-appearing  Cardiovascular:      Rate and Rhythm: Normal rate  Pulmonary:      Effort: No respiratory distress  Abdominal:      Palpations: Abdomen is soft  Skin:     Coloration: Skin is pale  Neurological:      Mental Status: She is alert  Mental status is at baseline  Psychiatric:         Mood and Affect: Mood normal         Discussion with Family: Updated  (friend) at bedside  Discharge instructions/Information to patient and family:   See after visit summary for information provided to patient and family        Provisions for Follow-Up " Care:  See after visit summary for information related to follow-up care and any pertinent home health orders  Disposition:   Home with VNA Services (Reminder: Complete face to face encounter)    Planned Readmission: no     Discharge Statement:  I spent 45 minutes discharging the patient  This time was spent on the day of discharge  I had direct contact with the patient on the day of discharge  Greater than 50% of the total time was spent examining patient, answering all patient questions, arranging and discussing plan of care with patient as well as directly providing post-discharge instructions  Additional time then spent on discharge activities  Discharge Medications:  See after visit summary for reconciled discharge medications provided to patient and/or family        **Please Note: This note may have been constructed using a voice recognition system**

## 2023-06-28 VITALS
BODY MASS INDEX: 55.32 KG/M2 | HEIGHT: 61 IN | TEMPERATURE: 97.9 F | HEART RATE: 73 BPM | RESPIRATION RATE: 16 BRPM | WEIGHT: 293 LBS | OXYGEN SATURATION: 96 % | SYSTOLIC BLOOD PRESSURE: 123 MMHG | DIASTOLIC BLOOD PRESSURE: 90 MMHG

## 2023-06-28 RX ADMIN — CELECOXIB 200 MG: 100 CAPSULE ORAL at 17:04

## 2023-06-28 RX ADMIN — CARVEDILOL 25 MG: 12.5 TABLET, FILM COATED ORAL at 08:22

## 2023-06-28 RX ADMIN — ACETAMINOPHEN 975 MG: 325 TABLET, FILM COATED ORAL at 15:37

## 2023-06-28 RX ADMIN — ATORVASTATIN CALCIUM 20 MG: 40 TABLET, FILM COATED ORAL at 17:04

## 2023-06-28 RX ADMIN — LOSARTAN POTASSIUM 100 MG: 50 TABLET, FILM COATED ORAL at 08:21

## 2023-06-28 RX ADMIN — Medication 9 MG: at 00:17

## 2023-06-28 RX ADMIN — CARVEDILOL 25 MG: 12.5 TABLET, FILM COATED ORAL at 15:37

## 2023-06-28 RX ADMIN — CELECOXIB 200 MG: 100 CAPSULE ORAL at 08:21

## 2023-06-28 RX ADMIN — HYDROCHLOROTHIAZIDE 25 MG: 25 TABLET ORAL at 08:21

## 2023-06-28 RX ADMIN — OXYCODONE HYDROCHLORIDE 10 MG: 10 TABLET ORAL at 10:11

## 2023-06-28 RX ADMIN — Medication 400 MG: at 17:04

## 2023-06-28 RX ADMIN — ACETAMINOPHEN 975 MG: 325 TABLET, FILM COATED ORAL at 05:45

## 2023-06-28 RX ADMIN — PANTOPRAZOLE SODIUM 40 MG: 40 TABLET, DELAYED RELEASE ORAL at 05:45

## 2023-06-28 RX ADMIN — Medication 400 MG: at 08:21

## 2023-06-28 RX ADMIN — ENOXAPARIN SODIUM 40 MG: 40 INJECTION SUBCUTANEOUS at 08:20

## 2023-06-28 RX ADMIN — DICLOFENAC SODIUM 4 G: 10 GEL TOPICAL at 18:36

## 2023-06-28 RX ADMIN — OXYCODONE HYDROCHLORIDE 10 MG: 10 TABLET ORAL at 20:33

## 2023-06-28 NOTE — CASE MANAGEMENT
Case Management Discharge Planning Note    Patient name Maryan Lopez  Location [id number] MRN [id number]  : 1965 Date 2023       Current Admission Date: 2023  Current Admission Diagnosis:Morbid obesity [facility])   Patient Active Problem List    Diagnosis Date Noted   • Ambulatory dysfunction 2023   • Knee pain 2023   • HTN (hypertension), benign    • Hyperlipidemia    • Morbid obesity ([address] )       LOS (days): 3  Geometric Mean LOS (GMLOS) (days): 2 70  Days to GMLOS:-0 4     OBJECTIVE:  Risk of Unplanned Readmission Score: 6 07         Current admission status: Inpatient   Preferred Pharmacy:   [address] [place] [id number]  Phone: [phone] Fax: [phone]    Primary Care Provider: No primary care provider on file  Primary Insurance: [address] [place] [address],[address] REP  Secondary Insurance:     DISCHARGE DETAILS:                                     DME Referral Provided  Referral made for DME?: Yes  DME referral completed for the following items[de-identified] Bedside Commode, Walker, Other (both bariatric)    Other Referral/Resources/Interventions Provided:  Referral Comments: Pt has decided she wants to return home w home health  [facility] health agency is [facility]; agency reserved in [address]  Bariatric commode and bariatric RW recommended for pt at home;  ordered through [address] and delivered to bedside from consignment  Bariatric stretcher transport arranged to home;  p/u at 2030  Nurse Mattie Babinski, pt and yusra made aware  LMN completed and placed in chart  CM areas of AVS completed           Treatment Team Recommendation: Short Term Rehab  Discharge Destination Plan[de-identified] Home with [place] at Discharge : BLS Ambulance                             IMM Given (Date):: 23  IMM Given to[de-identified] Patient

## 2023-06-28 NOTE — PLAN OF CARE
Problem: Potential for Falls  Goal: Patient will remain free of falls  Description: INTERVENTIONS:  - Educate patient/family on patient safety including physical limitations  - Instruct patient to call for assistance with activity   - Consult OT/PT to assist with strengthening/mobility   - Keep Call bell within reach  - Keep bed low and locked with side rails adjusted as appropriate  - Keep care items and personal belongings within reach  - Initiate and maintain comfort rounds  - Make Fall Risk Sign visible to staff  - Offer Toileting every 2 Hours, in advance of need  - Initiate/Maintain bed alarm  - Obtain necessary fall risk management equipment:   - Apply yellow socks and bracelet for high fall risk patients  - Consider moving patient to room near nurses station  Outcome: Adequate for Discharge     Problem: MOBILITY - ADULT  Goal: Maintain or return to baseline ADL function  Description: INTERVENTIONS:  -  Assess patient's ability to carry out ADLs; assess patient's baseline for ADL function and identify physical deficits which impact ability to perform ADLs (bathing, care of mouth/teeth, toileting, grooming, dressing, etc )  - Assess/evaluate cause of self-care deficits   - Assess range of motion  - Assess patient's mobility; develop plan if impaired  - Assess patient's need for assistive devices and provide as appropriate  - Encourage maximum independence but intervene and supervise when necessary  - Involve family in performance of ADLs  - Assess for home care needs following discharge   - Consider OT consult to assist with ADL evaluation and planning for discharge  - Provide patient education as appropriate  Outcome: Adequate for Discharge  Goal: Maintains/Returns to pre admission functional level  Description: INTERVENTIONS:  - Perform BMAT or MOVE assessment daily    - Set and communicate daily mobility goal to care team and patient/family/caregiver     - Collaborate with rehabilitation services on mobility goals if consulted  - Perform Range of Motion 2 times a day  - Reposition patient every 2 hours    - Dangle patient 2 times a day  - Stand patient 2 times a day  -- Out of bed to chair 2 times a day   - Out of bed for meals 3 times a day  - Out of bed for toileting  - Record patient progress and toleration of activity level   Outcome: Adequate for Discharge     Problem: PAIN - ADULT  Goal: Verbalizes/displays adequate comfort level or baseline comfort level  Description: Interventions:  - Encourage patient to monitor pain and request assistance  - Assess pain using appropriate pain scale  - Administer analgesics based on type and severity of pain and evaluate response  - Implement non-pharmacological measures as appropriate and evaluate response  - Consider cultural and social influences on pain and pain management  - Notify physician/advanced practitioner if interventions unsuccessful or patient reports new pain  Outcome: Adequate for Discharge     Problem: INFECTION - ADULT  Goal: Absence or prevention of progression during hospitalization  Description: INTERVENTIONS:  - Assess and monitor for signs and symptoms of infection  - Monitor lab/diagnostic results  - Monitor all insertion sites, i e  indwelling lines, tubes, and drains  - Monitor endotracheal if appropriate and nasal secretions for changes in amount and color  - [place] appropriate cooling/warming therapies per order  - Administer medications as ordered  - Instruct and encourage patient and family to use good hand hygiene technique  - Identify and instruct in appropriate isolation precautions for identified infection/condition  Outcome: Adequate for Discharge     Problem: SAFETY ADULT  Goal: Patient will remain free of falls  Description: INTERVENTIONS:  - Educate patient/family on patient safety including physical limitations  - Instruct patient to call for assistance with activity   - Consult OT/PT to assist with strengthening/mobility   - Keep Call bell within reach  - Keep bed low and locked with side rails adjusted as appropriate  - Keep care items and personal belongings within reach  - Initiate and maintain comfort rounds  - Make Fall Risk Sign visible to staff  - Offer Toileting every 2 Hours, in advance of need  - Initiate/Maintain bed alarm  - Obtain necessary fall risk management equipment:   - Apply yellow socks and bracelet for high fall risk patients  - Consider moving patient to room near nurses station  Outcome: Adequate for Discharge  Goal: Maintain or return to baseline ADL function  Description: INTERVENTIONS:  -  Assess patient's ability to carry out ADLs; assess patient's baseline for ADL function and identify physical deficits which impact ability to perform ADLs (bathing, care of mouth/teeth, toileting, grooming, dressing, etc )  - Assess/evaluate cause of self-care deficits   - Assess range of motion  - Assess patient's mobility; develop plan if impaired  - Assess patient's need for assistive devices and provide as appropriate  - Encourage maximum independence but intervene and supervise when necessary  - Involve family in performance of ADLs  - Assess for home care needs following discharge   - Consider OT consult to assist with ADL evaluation and planning for discharge  - Provide patient education as appropriate  Outcome: Adequate for Discharge  Goal: Maintains/Returns to pre admission functional level  Description: INTERVENTIONS:  - Perform BMAT or MOVE assessment daily    - Set and communicate daily mobility goal to care team and patient/family/caregiver  - Collaborate with rehabilitation services on mobility goals if consulted  - Perform Range of Motion 2 times a day  - Reposition patient every 2 hours    - Dangle patient 2 times a day  - Stand patient 2 times a day  - Out of bed to chair 2 times a day   - Out of bed for meals 3 times a day  - Out of bed for toileting  - Record patient progress and toleration of activity level   Outcome: Adequate for Discharge     Problem: DISCHARGE PLANNING  Goal: Discharge to home or other facility with appropriate resources  Description: INTERVENTIONS:  - Identify barriers to discharge w/patient and caregiver  - Arrange for needed discharge resources and transportation as appropriate  - Identify discharge learning needs (meds, wound care, etc )  - Arrange for interpretive services to assist at discharge as needed  - Refer to Case Management Department for coordinating discharge planning if the patient needs post-hospital services based on physician/advanced practitioner order or complex needs related to functional status, cognitive ability, or social support system  Outcome: Adequate for Discharge     Problem: Knowledge Deficit  Goal: Patient/family/caregiver demonstrates understanding of disease process, treatment plan, medications, and discharge instructions  Description: Complete learning assessment and assess knowledge base    Interventions:  - Provide teaching at level of understanding  - Provide teaching via preferred learning methods  Outcome: Adequate for Discharge     Problem: Prexisting or High Potential for Compromised Skin Integrity  Goal: Skin integrity is maintained or improved  Description: INTERVENTIONS:  - Identify patients at risk for skin breakdown  - Assess and monitor skin integrity  - Assess and monitor nutrition and hydration status  - Monitor labs   - Assess for incontinence   - Turn and reposition patient  - Assist with mobility/ambulation  - Relieve pressure over bony prominences  - Avoid friction and shearing  - Provide appropriate hygiene as needed including keeping skin clean and dry  - Evaluate need for skin moisturizer/barrier cream  - Collaborate with interdisciplinary team   - Patient/family teaching  - Consider wound care consult   Outcome: Adequate for Discharge

## 2023-06-28 NOTE — PLAN OF CARE
Problem: PAIN - ADULT  Goal: Verbalizes/displays adequate comfort level or baseline comfort level  Description: Interventions:  - Encourage patient to monitor pain and request assistance  - Assess pain using appropriate pain scale  - Administer analgesics based on type and severity of pain and evaluate response  - Implement non-pharmacological measures as appropriate and evaluate response  - Consider cultural and social influences on pain and pain management  - Notify physician/advanced practitioner if interventions unsuccessful or patient reports new pain  Outcome: Progressing     Problem: Knowledge Deficit  Goal: Patient/family/caregiver demonstrates understanding of disease process, treatment plan, medications, and discharge instructions  Description: Complete learning assessment and assess knowledge base    Interventions:  - Provide teaching at level of understanding  - Provide teaching via preferred learning methods  Outcome: Progressing     Problem: Prexisting or High Potential for Compromised Skin Integrity  Goal: Skin integrity is maintained or improved  Description: INTERVENTIONS:  - Identify patients at risk for skin breakdown  - Assess and monitor skin integrity  - Assess and monitor nutrition and hydration status  - Monitor labs   - Assess for incontinence   - Turn and reposition patient  - Assist with mobility/ambulation  - Relieve pressure over bony prominences  - Avoid friction and shearing  - Provide appropriate hygiene as needed including keeping skin clean and dry  - Evaluate need for skin moisturizer/barrier cream  - Collaborate with interdisciplinary team   - Patient/family teaching  - Consider wound care consult   Outcome: Progressing

## 2023-06-30 LAB
DME PARACHUTE DELIVERY DATE ACTUAL: NORMAL
DME PARACHUTE DELIVERY DATE EXPECTED: NORMAL
DME PARACHUTE DELIVERY DATE REQUESTED: NORMAL
DME PARACHUTE ITEM DESCRIPTION: NORMAL
DME PARACHUTE ITEM DESCRIPTION: NORMAL
DME PARACHUTE ORDER STATUS: NORMAL
DME PARACHUTE SUPPLIER NAME: NORMAL
DME PARACHUTE SUPPLIER PHONE: NORMAL

## 2023-07-12 NOTE — ED PROVIDER NOTES
History  Chief Complaint   Patient presents with   • Knee Pain     The pt stated that 4 days ago she felt a "pop" in her R knee while trying stand up from the toilet. Since then the pt has not been able to bare weight and her pain hasnt gotten better. 29-year-old female presenting to the emergency department for evaluation of knee pain. Patient felt a pop in her right knee 4 days ago while standing up. Has had aching pain and swelling to the knee since which has not gotten better. She is able to bear weight though it does cause her pain. She denies any numbness weakness or tingling. Patient does have some baseline ambulatory dysfunction and normally uses a walker intermittently. She has had significant difficulty moving around the house since injuring her knee. Prior to Admission Medications   Prescriptions Last Dose Informant Patient Reported? Taking?    Calcium Citrate-Vitamin D 250 mg-2.5 mcg tablet 6/24/2023 Self Yes Yes   Sig: Take 1 tablet by mouth 2 (two) times a day   Cranberry 1000 MG CAPS 6/24/2023 Self Yes Yes   Sig: Take 4,200 mg by mouth daily   HYDROcodone-acetaminophen (NORCO) 5-325 mg per tablet 6/24/2023 Self Yes Yes   Sig: Take 2 tablets by mouth every 4 (four) hours as needed for pain   Magnesium Citrate 200 MG TABS 6/24/2023 Self Yes Yes   Sig: Take 400 mg by mouth daily   Melatonin 10 MG TABS  Self Yes Yes   Sig: Take 10 mg by mouth daily at bedtime   atorvastatin (LIPITOR) 20 mg tablet 6/24/2023  Yes Yes   Sig: Take 1 tablet by mouth every evening   carvedilol (COREG) 12.5 mg tablet 6/24/2023  Yes Yes   Sig: Take 1 tablet by mouth 2 (two) times a day with meals   celecoxib (CeleBREX) 200 mg capsule 6/24/2023 Self Yes Yes   Sig: Take 200 mg by mouth 2 (two) times a day   esomeprazole (NexIUM) 40 MG capsule 6/24/2023  Yes Yes   Sig: Take 1 capsule by mouth daily before breakfast   fexofenadine (ALLEGRA) 180 MG tablet 6/24/2023 Self Yes Yes   Sig: Take 180 mg by mouth daily fluticasone (FLONASE) 50 mcg/act nasal spray  Self Yes Yes   Si spray into each nostril daily   levocetirizine (XYZAL) 5 MG tablet 2023 Self Yes Yes   Sig: Take 5 mg by mouth every evening   losartan-hydrochlorothiazide (HYZAAR) 100-25 MG per tablet 2023  Yes Yes   Sig: Take 1 tablet by mouth daily      Facility-Administered Medications: None       Past Medical History:   Diagnosis Date   • Hyperlipidemia    • Hypertension    • Morbid obesity ([address])    • Rheumatoid arthritis ([address])        Past Surgical History:   Procedure Laterality Date   • TONSILLECTOMY         Family History   Problem Relation Age of Onset   • Coronary artery disease Maternal Grandfather    • Diabetes Maternal Grandfather      I have reviewed and agree with the history as documented. E-Cigarette/Vaping   • E-Cigarette Use Never User      E-Cigarette/Vaping Substances     Social History     Tobacco Use   • Smoking status: Never   Vaping Use   • Vaping Use: Never used   Substance Use Topics   • Alcohol use: Never   • Drug use: Never       Review of Systems   Constitutional: Negative for appetite change, chills, fatigue and fever. HENT: Negative for sneezing and sore throat. Eyes: Negative for visual disturbance. Respiratory: Negative for cough, choking, chest tightness, shortness of breath and wheezing. Cardiovascular: Negative for chest pain and palpitations. Gastrointestinal: Negative for abdominal pain, constipation, diarrhea, nausea and vomiting. Genitourinary: Negative for difficulty urinating and dysuria. Musculoskeletal: Positive for arthralgias. Neurological: Negative for dizziness, weakness, light-headedness, numbness and headaches. All other systems reviewed and are negative. Physical Exam  Physical Exam  Vitals and nursing note reviewed. Constitutional:       General: She is not in acute distress. Appearance: She is well-developed. She is not diaphoretic.    HENT:      Head: Normocephalic and atraumatic. Eyes:      Pupils: Pupils are equal, round, and reactive to light. Neck:      Vascular: No JVD. Trachea: No tracheal deviation. Cardiovascular:      Rate and Rhythm: Normal rate and regular rhythm. Heart sounds: Normal heart sounds. No murmur heard. No friction rub. No gallop. Pulmonary:      Effort: Pulmonary effort is normal. No respiratory distress. Breath sounds: Normal breath sounds. No wheezing or rales. Abdominal:      General: Bowel sounds are normal. There is no distension. Palpations: Abdomen is soft. Tenderness: There is no abdominal tenderness. There is no guarding or rebound. Musculoskeletal:      Comments: Right knee tenderness with minimal effusion. Neurovascularly intact distally. Skin:     General: Skin is warm and dry. Coloration: Skin is not pale. Neurological:      Mental Status: She is alert and oriented to person, place, and time. Cranial Nerves: No cranial nerve deficit. Motor: No abnormal muscle tone.    Psychiatric:         Behavior: Behavior normal.         Vital Signs  ED Triage Vitals   Temperature Pulse Respirations Blood Pressure SpO2   06/24/23 2222 06/24/23 1835 06/24/23 1835 06/24/23 1835 06/24/23 1835   98.4 °F (36.9 °C) 83 20 (!) 195/91 99 %      Temp Source Heart Rate Source Patient Position - Orthostatic VS BP Location FiO2 (%)   06/26/23 1430 06/24/23 1835 06/24/23 1835 06/24/23 1835 --   Oral Monitor Lying Right arm       Pain Score       06/24/23 2235       No Pain           Vitals:    06/27/23 0733 06/27/23 1531 06/28/23 0802 06/28/23 1509   BP: 111/68 138/79 122/91 123/90   Pulse: 75 72 73 73   Patient Position - Orthostatic VS:             Visual Acuity      ED Medications  Medications   ketorolac (TORADOL) injection 15 mg (15 mg Intravenous Given 6/24/23 2035)   methocarbamol (ROBAXIN) tablet 500 mg (500 mg Oral Given 6/24/23 2035)   furosemide (LASIX) injection 40 mg (40 mg Intravenous Given 6/25/23 1751)   potassium chloride (K-DUR,KLOR-CON) CR tablet 40 mEq (40 mEq Oral Given 6/25/23 2241)   carvedilol (COREG) tablet 12.5 mg (12.5 mg Oral Given 6/26/23 0929)       Diagnostic Studies  Results Reviewed     Procedure Component Value Units Date/Time    D-dimer, quantitative [[id number]]  (Abnormal) Collected: 06/24/23 2156    Lab Status: Final result Specimen: Blood from Arm, Right Updated: 06/24/23 2224     D-Dimer, Quant 0.72 ug/ml FEU     Narrative: In the evaluation for possible pulmonary embolism, in the appropriate (Well's Score of 4 or less) patient, the age adjusted d-dimer cutoff for this patient can be calculated as:    Age x 0.01 (in ug/mL) for Age-adjusted D-dimer exclusion threshold for a patient over 50 years.     Comprehensive metabolic panel [[id number]]  (Abnormal) Collected: 06/24/23 2035    Lab Status: Final result Specimen: Blood from Arm, Right Updated: 06/24/23 2100     Sodium 140 mmol/L      Potassium 3.7 mmol/L      Chloride 102 mmol/L      CO2 29 mmol/L      ANION GAP 9 mmol/L      BUN 23 mg/dL      Creatinine 0.93 mg/dL      Glucose 100 mg/dL      Calcium 10.0 mg/dL      AST 26 U/L      ALT 21 U/L      Alkaline Phosphatase 114 U/L      Total Protein 8.4 g/dL      Albumin 4.2 g/dL      Total Bilirubin 0.53 mg/dL      eGFR 69 ml/min/1.73sq m     Narrative:      [place] guidelines for Chronic Kidney Disease (CKD):   •  Stage 1 with normal or high GFR (GFR > 90 mL/min/1.73 square meters)  •  Stage 2 Mild CKD (GFR = 60-89 mL/min/1.73 square meters)  •  Stage 3A Moderate CKD (GFR = 45-59 mL/min/1.73 square meters)  •  Stage 3B Moderate CKD (GFR = 30-44 mL/min/1.73 square meters)  •  Stage 4 Severe CKD (GFR = 15-29 mL/min/1.73 square meters)  •  Stage 5 End Stage CKD (GFR <15 mL/min/1.73 square meters)  Note: GFR calculation is accurate only with a steady state creatinine    CBC and differential [[id number]]  (Abnormal) Collected: 06/24/23 2035    Lab Status: Final result Specimen: Blood from Arm, Right Updated: 06/24/23 2041     WBC 10.46 Thousand/uL      RBC 4.42 Million/uL      Hemoglobin 12.6 g/dL      Hematocrit 39.7 %      MCV 90 fL      MCH 28.5 pg      MCHC 31.7 g/dL      RDW 14.7 %      MPV 9.0 fL      Platelets 928 Thousands/uL      nRBC 0 /100 WBCs      Neutrophils Relative 69 %      Immat GRANS % 1 %      Lymphocytes Relative 18 %      Monocytes Relative 10 %      Eosinophils Relative 1 %      Basophils Relative 1 %      Neutrophils Absolute 7.31 Thousands/µL      Immature Grans Absolute 0.05 Thousand/uL      Lymphocytes Absolute 1.85 Thousands/µL      Monocytes Absolute 1.06 Thousand/µL      Eosinophils Absolute 0.14 Thousand/µL      Basophils Absolute 0.05 Thousands/µL                  VAS lower limb venous duplex study, unilateral/limited   Final Result by Korey Galvan MD (06/26 1224)      XR knee 4+ vw right injury   Final Result by Sarahi Ramirez MD (06/25 1056)      No acute osseous abnormality. Degenerative changes as described. Workstation performed: [id number]                    Procedures  Procedures         ED Course                                             Medical Decision Making  51-year-old female with deep pain. Will check x-ray, give pain meds, reassess. X-ray shows no acute abnormalities, however patient still has pain. Given her baseline amatory dysfunction and difficulty being able to maintain a nonweightbearing status as well as the impairment of ADLs, it would be appropriate to admit for inpatient PT OT, possibly short-term rehab. Amount and/or Complexity of Data Reviewed  Labs: ordered. Risk  Prescription drug management. Decision regarding hospitalization.           Disposition  Final diagnoses:   Ambulatory dysfunction   Right leg pain     Time reflects when diagnosis was documented in both MDM as applicable and the Disposition within this note     Time User Action Codes Description Comment    6/24/2023  9:47 PM Aida Knightjace Add [R26.2] Ambulatory dysfunction     6/24/2023  9:47 PM Aida Portillo Add [S54.464] Right leg pain     6/26/2023  9:13 AM Emi Sarah Add [E66.01] Morbid obesity [facility])       ED Disposition     ED Disposition   Admit    Condition   Stable    Date/Time   Sat Jun 24, 2023  9:47 PM    Comment   Case was discussed with NERISSA and the patient's admission status was agreed to be Admission Status: observation status to the service of Dr. Magui Ames . Follow-up Information     Follow up With Specialties Details Why [address] [address] Follow up Agency will call you to set up a schedule of visits. Agency should see you within 48 hours of hospital discharge. Services requested include nursing, physical therapy, occupational therapy, and home health aide, if available.  [address] [id number]  [phone]            Discharge Medication List as of 6/28/2023  6:59 PM      CONTINUE these medications which have NOT CHANGED    Details   atorvastatin (LIPITOR) 20 mg tablet Take 1 tablet by mouth every evening, Starting Mon 2/6/2023, Historical Med      Calcium Citrate-Vitamin D 250 mg-2.5 mcg tablet Take 1 tablet by mouth 2 (two) times a day, Historical Med      carvedilol (COREG) 12.5 mg tablet Take 1 tablet by mouth 2 (two) times a day with meals, Starting Mon 5/8/2023, Historical Med      celecoxib (CeleBREX) 200 mg capsule Take 200 mg by mouth 2 (two) times a day, Historical Med      Cranberry 1000 MG CAPS Take 4,200 mg by mouth daily, Historical Med      esomeprazole (NexIUM) 40 MG capsule Take 1 capsule by mouth daily before breakfast, Starting Mon 4/17/2023, Historical Med      fexofenadine (ALLEGRA) 180 MG tablet Take 180 mg by mouth daily, Historical Med      fluticasone (FLONASE) 50 mcg/act nasal spray 1 spray into each nostril daily, Historical Med      HYDROcodone-acetaminophen ([employer]) 5-325 mg per tablet Take 2 tablets by mouth every 4 (four) hours as needed for pain, Historical Med      levocetirizine (XYZAL) 5 MG tablet Take 5 mg by mouth every evening, Historical Med      losartan-hydrochlorothiazide (HYZAAR) 100-25 MG per tablet Take 1 tablet by mouth daily, Starting Mon 4/10/2023, Historical Med      Magnesium Citrate 200 MG TABS Take 400 mg by mouth daily, Historical Med      Melatonin 10 MG TABS Take 10 mg by mouth daily at bedtime, Historical Med             Outpatient Discharge Orders   Wheelchair     Walker [place]     Commode chair     INTERNAL: Ambulatory Referral to Home Health   Standing Status: Future Standing Exp.  Date: 06/28/24      Activity as tolerated       PDMP Review     None          ED Provider  Electronically Signed by           Elvira Ramos MD  07/11/23 3696
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
## 2025-03-29 ENCOUNTER — APPOINTMENT (EMERGENCY)
Dept: CT IMAGING | Facility: HOSPITAL | Age: 60
DRG: 287 | End: 2025-03-29
Payer: COMMERCIAL

## 2025-03-29 ENCOUNTER — HOSPITAL ENCOUNTER (INPATIENT)
Facility: HOSPITAL | Age: 60
LOS: 2 days | Discharge: HOME | DRG: 287 | End: 2025-04-01
Admitting: FAMILY MEDICINE
Payer: COMMERCIAL

## 2025-03-29 DIAGNOSIS — N28.1 RENAL CYST: ICD-10-CM

## 2025-03-29 DIAGNOSIS — I21.4 NSTEMI (NON-ST ELEVATED MYOCARDIAL INFARCTION) (HCC): ICD-10-CM

## 2025-03-29 DIAGNOSIS — E27.9 ADRENAL NODULE (HCC): ICD-10-CM

## 2025-03-29 DIAGNOSIS — R11.2 NAUSEA & VOMITING: Primary | ICD-10-CM

## 2025-03-29 DIAGNOSIS — R07.9 CHEST PAIN: ICD-10-CM

## 2025-03-29 DIAGNOSIS — R79.89 ELEVATED TROPONIN: ICD-10-CM

## 2025-03-29 LAB
ALBUMIN SERPL BCG-MCNC: 4.3 G/DL (ref 3.5–5)
ALP SERPL-CCNC: 97 U/L (ref 34–104)
ALT SERPL W P-5'-P-CCNC: 15 U/L (ref 7–52)
ANION GAP SERPL CALCULATED.3IONS-SCNC: 11 MMOL/L (ref 4–13)
AST SERPL W P-5'-P-CCNC: 29 U/L (ref 13–39)
BASOPHILS # BLD AUTO: 0.02 THOUSANDS/ÂΜL (ref 0–0.1)
BASOPHILS NFR BLD AUTO: 0 % (ref 0–1)
BILIRUB SERPL-MCNC: 0.5 MG/DL (ref 0.2–1)
BUN SERPL-MCNC: 25 MG/DL (ref 5–25)
CALCIUM SERPL-MCNC: 10.1 MG/DL (ref 8.4–10.2)
CARDIAC TROPONIN I PNL SERPL HS: 1493 NG/L (ref ?–50)
CHLORIDE SERPL-SCNC: 100 MMOL/L (ref 96–108)
CO2 SERPL-SCNC: 27 MMOL/L (ref 21–32)
CREAT SERPL-MCNC: 0.94 MG/DL (ref 0.6–1.3)
EOSINOPHIL # BLD AUTO: 0 THOUSAND/ÂΜL (ref 0–0.61)
EOSINOPHIL NFR BLD AUTO: 0 % (ref 0–6)
ERYTHROCYTE [DISTWIDTH] IN BLOOD BY AUTOMATED COUNT: 14.4 % (ref 11.6–15.1)
GFR SERPL CREATININE-BSD FRML MDRD: 67 ML/MIN/1.73SQ M
GLUCOSE SERPL-MCNC: 159 MG/DL (ref 65–140)
HCT VFR BLD AUTO: 38.7 % (ref 34.8–46.1)
HGB BLD-MCNC: 12.5 G/DL (ref 11.5–15.4)
IMM GRANULOCYTES # BLD AUTO: 0.06 THOUSAND/UL (ref 0–0.2)
IMM GRANULOCYTES NFR BLD AUTO: 1 % (ref 0–2)
LACTATE SERPL-SCNC: 1.5 MMOL/L (ref 0.5–2)
LIPASE SERPL-CCNC: <6 U/L (ref 11–82)
LYMPHOCYTES # BLD AUTO: 0.69 THOUSANDS/ÂΜL (ref 0.6–4.47)
LYMPHOCYTES NFR BLD AUTO: 5 % (ref 14–44)
MCH RBC QN AUTO: 28.2 PG (ref 26.8–34.3)
MCHC RBC AUTO-ENTMCNC: 32.3 G/DL (ref 31.4–37.4)
MCV RBC AUTO: 87 FL (ref 82–98)
MONOCYTES # BLD AUTO: 0.41 THOUSAND/ÂΜL (ref 0.17–1.22)
MONOCYTES NFR BLD AUTO: 3 % (ref 4–12)
NEUTROPHILS # BLD AUTO: 12.02 THOUSANDS/ÂΜL (ref 1.85–7.62)
NEUTS SEG NFR BLD AUTO: 91 % (ref 43–75)
NRBC BLD AUTO-RTO: 0 /100 WBCS
PLATELET # BLD AUTO: 264 THOUSANDS/UL (ref 149–390)
PMV BLD AUTO: 9.6 FL (ref 8.9–12.7)
POTASSIUM SERPL-SCNC: 3.7 MMOL/L (ref 3.5–5.3)
PROT SERPL-MCNC: 8.4 G/DL (ref 6.4–8.4)
RBC # BLD AUTO: 4.43 MILLION/UL (ref 3.81–5.12)
SODIUM SERPL-SCNC: 138 MMOL/L (ref 135–147)
WBC # BLD AUTO: 13.2 THOUSAND/UL (ref 4.31–10.16)

## 2025-03-29 PROCEDURE — 96361 HYDRATE IV INFUSION ADD-ON: CPT

## 2025-03-29 PROCEDURE — 80053 COMPREHEN METABOLIC PANEL: CPT

## 2025-03-29 PROCEDURE — B2111ZZ FLUOROSCOPY OF MULTIPLE CORONARY ARTERIES USING LOW OSMOLAR CONTRAST: ICD-10-PCS | Performed by: INTERNAL MEDICINE

## 2025-03-29 PROCEDURE — 4A023N7 MEASUREMENT OF CARDIAC SAMPLING AND PRESSURE, LEFT HEART, PERCUTANEOUS APPROACH: ICD-10-PCS | Performed by: INTERNAL MEDICINE

## 2025-03-29 PROCEDURE — 83690 ASSAY OF LIPASE: CPT

## 2025-03-29 PROCEDURE — 99285 EMERGENCY DEPT VISIT HI MDM: CPT

## 2025-03-29 PROCEDURE — 84484 ASSAY OF TROPONIN QUANT: CPT

## 2025-03-29 PROCEDURE — 85025 COMPLETE CBC W/AUTO DIFF WBC: CPT

## 2025-03-29 PROCEDURE — 83605 ASSAY OF LACTIC ACID: CPT

## 2025-03-29 PROCEDURE — 74177 CT ABD & PELVIS W/CONTRAST: CPT

## 2025-03-29 PROCEDURE — 96376 TX/PRO/DX INJ SAME DRUG ADON: CPT

## 2025-03-29 PROCEDURE — 93005 ELECTROCARDIOGRAM TRACING: CPT

## 2025-03-29 PROCEDURE — 71260 CT THORAX DX C+: CPT

## 2025-03-29 PROCEDURE — 96375 TX/PRO/DX INJ NEW DRUG ADDON: CPT

## 2025-03-29 PROCEDURE — 36415 COLL VENOUS BLD VENIPUNCTURE: CPT

## 2025-03-29 RX ORDER — HEPARIN SODIUM 1000 [USP'U]/ML
4000 INJECTION, SOLUTION INTRAVENOUS; SUBCUTANEOUS ONCE
Status: COMPLETED | OUTPATIENT
Start: 2025-03-30 | End: 2025-03-30

## 2025-03-29 RX ORDER — METOCLOPRAMIDE HYDROCHLORIDE 5 MG/ML
10 INJECTION INTRAMUSCULAR; INTRAVENOUS ONCE
Status: COMPLETED | OUTPATIENT
Start: 2025-03-30 | End: 2025-03-30

## 2025-03-29 RX ORDER — HEPARIN SODIUM 10000 [USP'U]/100ML
3-20 INJECTION, SOLUTION INTRAVENOUS
Status: DISCONTINUED | OUTPATIENT
Start: 2025-03-30 | End: 2025-03-31

## 2025-03-29 RX ORDER — ASPIRIN 325 MG
325 TABLET ORAL ONCE
Status: COMPLETED | OUTPATIENT
Start: 2025-03-29 | End: 2025-03-29

## 2025-03-29 RX ORDER — HYDROMORPHONE HCL/PF 1 MG/ML
0.5 SYRINGE (ML) INJECTION ONCE
Status: COMPLETED | OUTPATIENT
Start: 2025-03-29 | End: 2025-03-29

## 2025-03-29 RX ORDER — FENTANYL CITRATE 50 UG/ML
50 INJECTION, SOLUTION INTRAMUSCULAR; INTRAVENOUS ONCE
Refills: 0 | Status: COMPLETED | OUTPATIENT
Start: 2025-03-29 | End: 2025-03-29

## 2025-03-29 RX ORDER — HEPARIN SODIUM 1000 [USP'U]/ML
4000 INJECTION, SOLUTION INTRAVENOUS; SUBCUTANEOUS EVERY 6 HOURS PRN
Status: DISCONTINUED | OUTPATIENT
Start: 2025-03-29 | End: 2025-03-31

## 2025-03-29 RX ORDER — ONDANSETRON 2 MG/ML
4 INJECTION INTRAMUSCULAR; INTRAVENOUS ONCE
Status: COMPLETED | OUTPATIENT
Start: 2025-03-29 | End: 2025-03-29

## 2025-03-29 RX ORDER — HEPARIN SODIUM 1000 [USP'U]/ML
2000 INJECTION, SOLUTION INTRAVENOUS; SUBCUTANEOUS EVERY 6 HOURS PRN
Status: DISCONTINUED | OUTPATIENT
Start: 2025-03-29 | End: 2025-03-31

## 2025-03-29 RX ORDER — ONDANSETRON 2 MG/ML
INJECTION INTRAMUSCULAR; INTRAVENOUS
Status: COMPLETED
Start: 2025-03-29 | End: 2025-03-29

## 2025-03-29 RX ADMIN — ASPIRIN 325 MG ORAL TABLET 325 MG: 325 PILL ORAL at 23:53

## 2025-03-29 RX ADMIN — ONDANSETRON 4 MG: 2 INJECTION INTRAMUSCULAR; INTRAVENOUS at 23:59

## 2025-03-29 RX ADMIN — IOHEXOL 100 ML: 350 INJECTION, SOLUTION INTRAVENOUS at 23:27

## 2025-03-29 RX ADMIN — ONDANSETRON 4 MG: 2 INJECTION INTRAMUSCULAR; INTRAVENOUS at 22:21

## 2025-03-29 RX ADMIN — SODIUM CHLORIDE 1000 ML: 0.9 INJECTION, SOLUTION INTRAVENOUS at 22:22

## 2025-03-29 RX ADMIN — FENTANYL CITRATE 50 MCG: 0.05 INJECTION, SOLUTION INTRAMUSCULAR; INTRAVENOUS at 22:22

## 2025-03-29 RX ADMIN — FENTANYL CITRATE 50 MCG: 0.05 INJECTION, SOLUTION INTRAMUSCULAR; INTRAVENOUS at 22:51

## 2025-03-29 RX ADMIN — IOHEXOL 10 ML: 240 INJECTION, SOLUTION INTRATHECAL; INTRAVASCULAR; INTRAVENOUS; ORAL at 23:27

## 2025-03-29 RX ADMIN — HYDROMORPHONE HYDROCHLORIDE 0.5 MG: 1 INJECTION, SOLUTION INTRAMUSCULAR; INTRAVENOUS; SUBCUTANEOUS at 23:53

## 2025-03-30 ENCOUNTER — APPOINTMENT (INPATIENT)
Dept: NON INVASIVE DIAGNOSTICS | Facility: HOSPITAL | Age: 60
DRG: 287 | End: 2025-03-30
Payer: COMMERCIAL

## 2025-03-30 PROBLEM — N18.31 STAGE 3A CHRONIC KIDNEY DISEASE (HCC): Status: ACTIVE | Noted: 2024-10-31

## 2025-03-30 PROBLEM — R79.89 ELEVATED TROPONIN: Status: ACTIVE | Noted: 2025-03-30

## 2025-03-30 PROBLEM — R93.5 ABNORMAL CT OF THE ABDOMEN: Status: ACTIVE | Noted: 2025-03-30

## 2025-03-30 PROBLEM — M06.9 RHEUMATOID ARTHRITIS (HCC): Status: ACTIVE | Noted: 2024-10-31

## 2025-03-30 LAB
2HR DELTA HS TROPONIN: 1166 NG/L
2HR DELTA HS TROPONIN: 22 NG/L
4HR DELTA HS TROPONIN: -1091 NG/L
4HR DELTA HS TROPONIN: 2713 NG/L
APTT PPP: 30 SECONDS (ref 23–34)
APTT PPP: 51 SECONDS (ref 23–34)
APTT PPP: 57 SECONDS (ref 23–34)
APTT PPP: 60 SECONDS (ref 23–34)
ATRIAL RATE: 105 BPM
ATRIAL RATE: 74 BPM
ATRIAL RATE: 74 BPM
CARDIAC TROPONIN I PNL SERPL HS: 2659 NG/L (ref ?–50)
CARDIAC TROPONIN I PNL SERPL HS: 4206 NG/L (ref ?–50)
CARDIAC TROPONIN I PNL SERPL HS: 4344 NG/L (ref ?–50)
CARDIAC TROPONIN I PNL SERPL HS: 5435 NG/L (ref ?–50)
CARDIAC TROPONIN I PNL SERPL HS: 5457 NG/L (ref ?–50)
ERYTHROCYTE [DISTWIDTH] IN BLOOD BY AUTOMATED COUNT: 14.4 % (ref 11.6–15.1)
GLUCOSE SERPL-MCNC: 102 MG/DL (ref 65–140)
HCT VFR BLD AUTO: 37.5 % (ref 34.8–46.1)
HGB BLD-MCNC: 12.1 G/DL (ref 11.5–15.4)
INR PPP: 1.07 (ref 0.85–1.19)
MCH RBC QN AUTO: 28.4 PG (ref 26.8–34.3)
MCHC RBC AUTO-ENTMCNC: 32.3 G/DL (ref 31.4–37.4)
MCV RBC AUTO: 88 FL (ref 82–98)
P AXIS: 46 DEGREES
P AXIS: 78 DEGREES
P AXIS: 81 DEGREES
PLATELET # BLD AUTO: 239 THOUSANDS/UL (ref 149–390)
PMV BLD AUTO: 9.3 FL (ref 8.9–12.7)
PR INTERVAL: 194 MS
PR INTERVAL: 198 MS
PR INTERVAL: 206 MS
PROTHROMBIN TIME: 14.6 SECONDS (ref 12.3–15)
QRS AXIS: 102 DEGREES
QRS AXIS: 75 DEGREES
QRS AXIS: 95 DEGREES
QRSD INTERVAL: 82 MS
QRSD INTERVAL: 84 MS
QRSD INTERVAL: 92 MS
QT INTERVAL: 360 MS
QT INTERVAL: 412 MS
QT INTERVAL: 422 MS
QTC INTERVAL: 457 MS
QTC INTERVAL: 468 MS
QTC INTERVAL: 476 MS
RBC # BLD AUTO: 4.26 MILLION/UL (ref 3.81–5.12)
T WAVE AXIS: 38 DEGREES
T WAVE AXIS: 64 DEGREES
T WAVE AXIS: 80 DEGREES
VENTRICULAR RATE: 105 BPM
VENTRICULAR RATE: 74 BPM
VENTRICULAR RATE: 74 BPM
WBC # BLD AUTO: 12.6 THOUSAND/UL (ref 4.31–10.16)

## 2025-03-30 PROCEDURE — 84484 ASSAY OF TROPONIN QUANT: CPT

## 2025-03-30 PROCEDURE — 93010 ELECTROCARDIOGRAM REPORT: CPT | Performed by: INTERNAL MEDICINE

## 2025-03-30 PROCEDURE — 82948 REAGENT STRIP/BLOOD GLUCOSE: CPT

## 2025-03-30 PROCEDURE — 96375 TX/PRO/DX INJ NEW DRUG ADDON: CPT

## 2025-03-30 PROCEDURE — 85730 THROMBOPLASTIN TIME PARTIAL: CPT | Performed by: FAMILY MEDICINE

## 2025-03-30 PROCEDURE — 84484 ASSAY OF TROPONIN QUANT: CPT | Performed by: PHYSICIAN ASSISTANT

## 2025-03-30 PROCEDURE — 96365 THER/PROPH/DIAG IV INF INIT: CPT

## 2025-03-30 PROCEDURE — 85027 COMPLETE CBC AUTOMATED: CPT

## 2025-03-30 PROCEDURE — 93005 ELECTROCARDIOGRAM TRACING: CPT

## 2025-03-30 PROCEDURE — 36415 COLL VENOUS BLD VENIPUNCTURE: CPT

## 2025-03-30 PROCEDURE — 85730 THROMBOPLASTIN TIME PARTIAL: CPT | Performed by: INTERNAL MEDICINE

## 2025-03-30 PROCEDURE — 85610 PROTHROMBIN TIME: CPT

## 2025-03-30 PROCEDURE — 99223 1ST HOSP IP/OBS HIGH 75: CPT | Performed by: INTERNAL MEDICINE

## 2025-03-30 PROCEDURE — 99222 1ST HOSP IP/OBS MODERATE 55: CPT | Performed by: INTERNAL MEDICINE

## 2025-03-30 PROCEDURE — 85730 THROMBOPLASTIN TIME PARTIAL: CPT

## 2025-03-30 RX ORDER — ASPIRIN 81 MG/1
81 TABLET, CHEWABLE ORAL DAILY
Status: DISCONTINUED | OUTPATIENT
Start: 2025-03-30 | End: 2025-04-01 | Stop reason: HOSPADM

## 2025-03-30 RX ORDER — MAGNESIUM HYDROXIDE/ALUMINUM HYDROXICE/SIMETHICONE 120; 1200; 1200 MG/30ML; MG/30ML; MG/30ML
30 SUSPENSION ORAL EVERY 6 HOURS PRN
Status: DISCONTINUED | OUTPATIENT
Start: 2025-03-30 | End: 2025-04-01 | Stop reason: HOSPADM

## 2025-03-30 RX ORDER — SODIUM CHLORIDE 9 MG/ML
125 INJECTION, SOLUTION INTRAVENOUS CONTINUOUS
Status: DISCONTINUED | OUTPATIENT
Start: 2025-03-30 | End: 2025-03-30

## 2025-03-30 RX ORDER — SODIUM CHLORIDE 9 MG/ML
125 INJECTION, SOLUTION INTRAVENOUS CONTINUOUS
Status: DISPENSED | OUTPATIENT
Start: 2025-03-30 | End: 2025-03-31

## 2025-03-30 RX ORDER — FUROSEMIDE 20 MG/1
20 TABLET ORAL DAILY
COMMUNITY
End: 2025-04-01

## 2025-03-30 RX ORDER — SODIUM CHLORIDE 9 MG/ML
75 INJECTION, SOLUTION INTRAVENOUS CONTINUOUS
Status: DISPENSED | OUTPATIENT
Start: 2025-03-31 | End: 2025-03-31

## 2025-03-30 RX ORDER — ACETAMINOPHEN 10 MG/ML
1000 INJECTION, SOLUTION INTRAVENOUS EVERY 6 HOURS PRN
Status: DISCONTINUED | OUTPATIENT
Start: 2025-03-30 | End: 2025-04-01 | Stop reason: HOSPADM

## 2025-03-30 RX ORDER — PROMETHAZINE HYDROCHLORIDE 25 MG/ML
25 INJECTION, SOLUTION INTRAMUSCULAR; INTRAVENOUS EVERY 6 HOURS PRN
Status: DISCONTINUED | OUTPATIENT
Start: 2025-03-30 | End: 2025-04-01 | Stop reason: HOSPADM

## 2025-03-30 RX ORDER — ATORVASTATIN CALCIUM 20 MG/1
20 TABLET, FILM COATED ORAL EVERY EVENING
Status: DISCONTINUED | OUTPATIENT
Start: 2025-03-30 | End: 2025-04-01 | Stop reason: HOSPADM

## 2025-03-30 RX ORDER — SODIUM CHLORIDE 9 MG/ML
75 INJECTION, SOLUTION INTRAVENOUS CONTINUOUS
Status: DISCONTINUED | OUTPATIENT
Start: 2025-03-30 | End: 2025-03-30

## 2025-03-30 RX ORDER — CARVEDILOL 12.5 MG/1
12.5 TABLET ORAL 2 TIMES DAILY WITH MEALS
Status: DISCONTINUED | OUTPATIENT
Start: 2025-03-30 | End: 2025-04-01 | Stop reason: HOSPADM

## 2025-03-30 RX ORDER — FLUTICASONE PROPIONATE 50 MCG
1 SPRAY, SUSPENSION (ML) NASAL 2 TIMES DAILY
Status: DISCONTINUED | OUTPATIENT
Start: 2025-03-30 | End: 2025-04-01 | Stop reason: HOSPADM

## 2025-03-30 RX ORDER — FUROSEMIDE 20 MG/1
20 TABLET ORAL DAILY
Status: DISCONTINUED | OUTPATIENT
Start: 2025-03-30 | End: 2025-03-30

## 2025-03-30 RX ORDER — SEMAGLUTIDE 0.5 MG/.5ML
INJECTION, SOLUTION SUBCUTANEOUS WEEKLY
COMMUNITY

## 2025-03-30 RX ORDER — LORATADINE 10 MG/1
10 TABLET ORAL DAILY
Status: DISCONTINUED | OUTPATIENT
Start: 2025-03-30 | End: 2025-04-01 | Stop reason: HOSPADM

## 2025-03-30 RX ORDER — NITROGLYCERIN 0.4 MG/1
0.4 TABLET SUBLINGUAL
Status: DISCONTINUED | OUTPATIENT
Start: 2025-03-30 | End: 2025-04-01 | Stop reason: HOSPADM

## 2025-03-30 RX ORDER — HYDROCHLOROTHIAZIDE 25 MG/1
25 TABLET ORAL DAILY
Status: DISCONTINUED | OUTPATIENT
Start: 2025-03-30 | End: 2025-03-30

## 2025-03-30 RX ORDER — ONDANSETRON 2 MG/ML
4 INJECTION INTRAMUSCULAR; INTRAVENOUS EVERY 6 HOURS PRN
Status: DISCONTINUED | OUTPATIENT
Start: 2025-03-30 | End: 2025-04-01 | Stop reason: HOSPADM

## 2025-03-30 RX ORDER — ASPIRIN 81 MG/1
81 TABLET, CHEWABLE ORAL DAILY
COMMUNITY

## 2025-03-30 RX ORDER — ACETAMINOPHEN 325 MG/1
975 TABLET ORAL EVERY 8 HOURS PRN
Status: DISCONTINUED | OUTPATIENT
Start: 2025-03-30 | End: 2025-03-30

## 2025-03-30 RX ORDER — PANTOPRAZOLE SODIUM 40 MG/10ML
40 INJECTION, POWDER, LYOPHILIZED, FOR SOLUTION INTRAVENOUS ONCE
Status: COMPLETED | OUTPATIENT
Start: 2025-03-30 | End: 2025-03-30

## 2025-03-30 RX ORDER — PANTOPRAZOLE SODIUM 40 MG/1
40 TABLET, DELAYED RELEASE ORAL DAILY
Status: DISCONTINUED | OUTPATIENT
Start: 2025-03-30 | End: 2025-04-01 | Stop reason: HOSPADM

## 2025-03-30 RX ORDER — LOSARTAN POTASSIUM 50 MG/1
100 TABLET ORAL DAILY
Status: DISCONTINUED | OUTPATIENT
Start: 2025-03-30 | End: 2025-04-01 | Stop reason: HOSPADM

## 2025-03-30 RX ADMIN — ONDANSETRON 4 MG: 2 INJECTION INTRAMUSCULAR; INTRAVENOUS at 19:39

## 2025-03-30 RX ADMIN — FLUTICASONE PROPIONATE 1 SPRAY: 50 SPRAY, METERED NASAL at 17:07

## 2025-03-30 RX ADMIN — CARVEDILOL 12.5 MG: 12.5 TABLET, FILM COATED ORAL at 17:07

## 2025-03-30 RX ADMIN — METOCLOPRAMIDE 10 MG: 5 INJECTION, SOLUTION INTRAMUSCULAR; INTRAVENOUS at 00:52

## 2025-03-30 RX ADMIN — HEPARIN SODIUM 2000 UNITS: 1000 INJECTION, SOLUTION INTRAVENOUS; SUBCUTANEOUS at 07:28

## 2025-03-30 RX ADMIN — FLUTICASONE PROPIONATE 1 SPRAY: 50 SPRAY, METERED NASAL at 12:12

## 2025-03-30 RX ADMIN — PANTOPRAZOLE SODIUM 40 MG: 40 INJECTION, POWDER, FOR SOLUTION INTRAVENOUS at 20:14

## 2025-03-30 RX ADMIN — LOSARTAN POTASSIUM 100 MG: 50 TABLET, FILM COATED ORAL at 08:53

## 2025-03-30 RX ADMIN — HEPARIN SODIUM 2000 UNITS: 1000 INJECTION, SOLUTION INTRAVENOUS; SUBCUTANEOUS at 13:56

## 2025-03-30 RX ADMIN — CARVEDILOL 12.5 MG: 12.5 TABLET, FILM COATED ORAL at 08:53

## 2025-03-30 RX ADMIN — PANTOPRAZOLE SODIUM 40 MG: 40 TABLET, DELAYED RELEASE ORAL at 08:53

## 2025-03-30 RX ADMIN — LORATADINE 10 MG: 10 TABLET ORAL at 08:53

## 2025-03-30 RX ADMIN — HYDROCHLOROTHIAZIDE 25 MG: 25 TABLET ORAL at 08:53

## 2025-03-30 RX ADMIN — MORPHINE SULFATE 2 MG: 2 INJECTION, SOLUTION INTRAMUSCULAR; INTRAVENOUS at 20:16

## 2025-03-30 RX ADMIN — HEPARIN SODIUM 11.1 UNITS/KG/HR: 10000 INJECTION, SOLUTION INTRAVENOUS at 00:37

## 2025-03-30 RX ADMIN — ASPIRIN 81 MG: 81 TABLET, CHEWABLE ORAL at 08:53

## 2025-03-30 RX ADMIN — HEPARIN SODIUM 15.1 UNITS/KG/HR: 10000 INJECTION, SOLUTION INTRAVENOUS at 21:21

## 2025-03-30 RX ADMIN — ACETAMINOPHEN 975 MG: 325 TABLET, FILM COATED ORAL at 17:59

## 2025-03-30 RX ADMIN — ONDANSETRON 4 MG: 2 INJECTION INTRAMUSCULAR; INTRAVENOUS at 10:48

## 2025-03-30 RX ADMIN — PROMETHAZINE HYDROCHLORIDE 25 MG: 25 INJECTION INTRAMUSCULAR; INTRAVENOUS at 14:41

## 2025-03-30 RX ADMIN — HEPARIN SODIUM 4000 UNITS: 1000 INJECTION, SOLUTION INTRAVENOUS; SUBCUTANEOUS at 00:36

## 2025-03-30 RX ADMIN — ATORVASTATIN CALCIUM 20 MG: 20 TABLET, FILM COATED ORAL at 17:07

## 2025-03-30 RX ADMIN — SODIUM CHLORIDE 125 ML/HR: 0.9 INJECTION, SOLUTION INTRAVENOUS at 20:35

## 2025-03-30 NOTE — PLAN OF CARE
Problem: Potential for Falls  Goal: Patient will remain free of falls  Description: INTERVENTIONS:- Educate patient/family on patient safety including physical limitations- Instruct patient to call for assistance with activity - Consult OT/PT to assist with strengthening/mobility - Keep Call bell within reach- Keep bed low and locked with side rails adjusted as appropriate- Keep care items and personal belongings within reach- Initiate and maintain comfort rounds- Make Fall Risk Sign visible to staff-   Offer Toileting every 2 Hours, in advance of need- Initiate/Maintain bed alarm-  Apply yellow socks and bracelet for high fall risk patients- Consider moving patient to room near nurses station  Outcome: Progressing

## 2025-03-30 NOTE — INCIDENTAL FINDINGS
"The following findings require follow up:  Radiographic finding   Finding: \" 39 mm exophytic left upper pole renal cystic lesion is slightly higher in attenuation than simple fluid.  There is a 28 mm right adrenal nodule which contains punctate calcifications.\"   Follow up required: Outpatient with primary care physician   Follow up should be done within 3 month(s)    Please notify the following clinician to assist with the follow up:  Primary care physician    "

## 2025-03-30 NOTE — CONSULTS
Consultation - Cardiology   Sara Walker 59 y.o. female MRN: [id number]  Unit/Bed#: -01 Encounter: [id number]  03/30/25  10:09 AM    Assessment & Plan  Elevated troponin  Experienced episodes of emesis followed by chest pain.  Troponin elevated at 5457.  EKG's reviewed.  TTE pending.  Discussed consideration for further ischemic evaluation.  Patient wishes to proceed with cardiac catheterization.  Plan for cardiac catheterization tomorrow.  Continue ASA, Lipitor, Coreg, and heparin gtt.    Discussed the indications, alternatives, risks and benefit of cardiac catheterization and possible PCI. The procedure risks, benefits, and complications (including but not limited to bleeding, infection, arrhythmia, nephrotoxicity, vessel injury, myocardial infarction, CVA, and death) were reviewed.  Patient is alert and oriented x3 and wishes to proceed. All questions answered.   Primary hypertension  Continue Coreg and losartan.  Discontinue HCTZ and Lasix.  Mixed hyperlipidemia  Continue Lipitor.        History of Present Illness   Physician Requesting Consult: Vernon Tenorio,*    Reason for Consult / Principal Problem: Elevated troponin    HPI: Sara Walker is a 59 y.o. year old female with history of hypertension, hyperlipidemia, RA, CKD 2/3, and morbid obesity who presents with nausea/vomiting and chest pain.  Patient experienced episodes of emesis followed by midsternal chest pain.  Troponins found to be significantly elevated upon admission.  Cardiology consulted for further evaluation and management.  Chest pain has resolved since time of admission, however patient continues to endorse nausea.  Denies previous history of CAD or tobacco use.  Family history is significant for father with CABG x4 and grandfather with CAD.  Denies any additional complaints at this time.    Inpatient consult to Cardiology  Consult performed by: Barber Reeves PA-C  Consult ordered by: Natty Bah  EVANS        Review of Systems   Constitutional:  Negative for chills and fever.   HENT:  Negative for ear pain and sore throat.    Eyes:  Negative for pain and visual disturbance.   Respiratory:  Negative for cough and shortness of breath.    Cardiovascular:  Positive for chest pain. Negative for palpitations and leg swelling.   Gastrointestinal:  Positive for nausea and vomiting. Negative for abdominal pain.   Genitourinary:  Negative for dysuria and hematuria.   Musculoskeletal:  Negative for arthralgias and back pain.   Skin:  Negative for color change and rash.   Neurological:  Negative for seizures and syncope.   All other systems reviewed and are negative.      Historical Information   Past Medical History:   Diagnosis Date    Hyperlipidemia     Hypertension     Morbid obesity (HCC)     Rheumatoid arthritis (HCC)      Past Surgical History:   Procedure Laterality Date    TONSILLECTOMY       Social History     Substance and Sexual Activity   Alcohol Use Never     Social History     Substance and Sexual Activity   Drug Use Never     Social History     Tobacco Use   Smoking Status Never   Smokeless Tobacco Not on file       Family History:   Family History   Problem Relation Age of Onset    Coronary artery disease Maternal Grandfather     Diabetes Maternal Grandfather        Meds/Allergies   all current active meds have been reviewed  Allergies   Allergen Reactions    Mangifera Indica Swelling    Strawberry Extract - Food Allergy Hives       Objective   Vitals: Blood pressure 134/84, pulse 94, temperature 98.9 °F (37.2 °C), resp. rate 19, height 5' (1.524 m), weight (!) 148 kg (325 lb 6.4 oz), SpO2 90%., Body mass index is 63.55 kg/m².,   Orthostatic Blood Pressures      Flowsheet Row Most Recent Value   Blood Pressure 134/84 filed at 2025 0817   Patient Position - Orthostatic VS Lying filed at 2025 0115            Systolic (24hrs), Av , Min:118 , Max:168     Diastolic (24hrs), Av, Min:76,  Max:84      No intake or output data in the 24 hours ending 03/30/25 1009    Invasive Devices       Peripheral Intravenous Line  Duration             Peripheral IV 03/29/25 Right Antecubital <1 day                        Physical Exam:  GEN: Alert and oriented x3, in no acute distress.  Well appearing, obese, and well nourished.   HEENT: Sclera anicteric, conjunctivae pink, mucous membranes moist. Oropharynx clear.   NECK: Supple, no carotid bruits, no significant JVD. Trachea midline, no thyromegaly.   HEART: Regular rhythm, normal S1 and S2, no murmurs, clicks, gallops or rubs. PMI nondisplaced, no thrills.   LUNGS: Clear to auscultation bilaterally; no wheezes, rales, or rhonchi. No increased work of breathing or signs of respiratory distress.   ABDOMEN: Soft, nontender, nondistended, normoactive bowel sounds.   EXTREMITIES: Skin warm and well perfused, no clubbing or cyanosis, no edema.  NEURO: No focal findings. Normal speech. Mood and affect normal.   SKIN: Normal without suspicious lesions on exposed skin.    Lab Results:     Cardiac Profile:   Results from last 7 days   Lab Units 03/30/25  0649 03/30/25  0452 03/30/25  0230 03/30/25  0039 03/29/25  2222   HS TNI 0HR ng/L  --  5,435*  --   --  1,493*   HS TNI 2HR ng/L 5,457*  --   --  2,659*  --    HSTNI D2 ng/L 22*  --   --  1,166*  --    HS TNI 4HR ng/L  --   --  4,206*  --   --    HSTNI D4 ng/L  --   --  2,713*  --   --        CBC with diff:   Results from last 7 days   Lab Units 03/30/25  0039 03/29/25  2222   WBC Thousand/uL 12.60* 13.20*   HEMOGLOBIN g/dL 12.1 12.5   HEMATOCRIT % 37.5 38.7   MCV fL 88 87   PLATELETS Thousands/uL 239 264   RBC Million/uL 4.26 4.43   MCH pg 28.4 28.2   MCHC g/dL 32.3 32.3   RDW % 14.4 14.4   MPV fL 9.3 9.6   NRBC AUTO /100 WBCs  --  0         CMP:   Results from last 7 days   Lab Units 03/29/25  2222 03/28/25  0939   POTASSIUM mmol/L 3.7 3.7   CHLORIDE mmol/L 100 102   CO2 mmol/L 27 29   BUN mg/dL 25 28*   CREATININE  mg/dL 0.94 1.15*   CALCIUM mg/dL 10.1 9.3   AST U/L 29 17   ALT U/L 15 12   ALK PHOS U/L 97 87   EGFR ml/min/1.73sq m 67 55*

## 2025-03-30 NOTE — ED PROVIDER NOTES
Time reflects when diagnosis was documented in both MDM as applicable and the Disposition within this note       Time User Action Codes Description Comment    3/30/2025  1:47 AM Ferny Hoang Add [R11.2] Nausea & vomiting     3/30/2025  1:47 AM Ferny Hoang Add [R07.9] Chest pain     3/30/2025  1:47 AM Ferny Hoang Add [I21.4] NSTEMI (non-ST elevated myocardial infarction) (HCC)     3/30/2025 11:17 AM Barber Reeves Add [R79.89] Elevated troponin     3/30/2025  4:00 PM Lj Saul Modify [R07.9] Chest pain     3/30/2025  4:00 PM Lj Saul Modify [R79.89] Elevated troponin     4/1/2025  9:06 AM Ferny Hoang Add [E27.9] Adrenal nodule (HCC)     4/1/2025  9:07 AM Ferny Hoang Add [N28.1] Renal cyst           ED Disposition       ED Disposition   Admit    Condition   Stable    Date/Time   Sun Mar 30, 2025  2:01 AM    Comment   Case was discussed with mikala and the patient's admission status was agreed to be Admission Status: inpatient status to the service of Dr. Garay .               Assessment & Plan       Medical Decision Making  59y F p/w nausea/vomiting since earlier today    Ddx includes but is not limited to esophageal abnormality, pneumomediastinum, intraabdominal pathology, ACS, electrolyte derangement, anemia    Plan: bloodwork, imaging    Workup in the emergency department reveals adrenal nodules, renal cyst.  Additionally patient has markedly elevated troponin.  Discussed with cardiology, ECG sent to cardiology.  Recommendations for aspirin, statin, beta-blocker, heparinization relayed to internal medicine.  No intrathoracic injury identified on imaging.  Patient heparinized in the emergency department.  She was admitted in stable condition.    Amount and/or Complexity of Data Reviewed  Labs: ordered.  Radiology: ordered.    Risk  OTC drugs.  Prescription drug management.  Decision regarding hospitalization.        ED Course as of 04/01/25 0907   Sat Mar 29, 2025    2209 ECG interpretation by me.  Normal sinus rhythm at 74.  Normal intervals.  Questionable right axis deviation.  T wave inversions in aVL no prior for comparison.  Otherwise no other T wave or ST abnormality.  No VLAD.   2307 Discussed elevated troponin with cardiology who recommend ASA, statin, BB, heparinization. Will await CT to ensure no catastrophic esophageal injury which could be C/I to heparinization   2356 Patient with ongoing vomiting, will give Reglan   Sun Mar 30, 2025   0048 Repeat ECG normal sinus rhythm at 74, right axis deviation.  QTc 468.  No new T wave changes, no new ST elevation when compared to earlier during patient stay       Medications   ondansetron (ZOFRAN) injection 4 mg (4 mg Intravenous Given 3/29/25 2359)   sodium chloride 0.9 % bolus 1,000 mL (0 mL Intravenous Stopped 3/30/25 0105)   fentaNYL injection 50 mcg (50 mcg Intravenous Given 3/29/25 2222)   fentaNYL injection 50 mcg (50 mcg Intravenous Given 3/29/25 2251)   iohexol (OMNIPAQUE) 350 MG/ML injection (MULTI-DOSE) 100 mL (100 mL Intravenous Given 3/29/25 2327)   iohexol (OMNIPAQUE) 240 MG/ML solution 50 mL (10 mL Oral Given 3/29/25 2327)   aspirin tablet 325 mg (325 mg Oral Given 3/29/25 2353)   HYDROmorphone (DILAUDID) injection 0.5 mg (0.5 mg Intravenous Given 3/29/25 2353)   heparin (porcine) injection 4,000 Units (4,000 Units Intravenous Given 3/30/25 0036)   metoclopramide (REGLAN) injection 10 mg (10 mg Intravenous Given 3/30/25 0052)       ED Risk Strat Scores                            SBIRT 22yo+      Flowsheet Row Most Recent Value   Initial Alcohol Screen: US AUDIT-C     1. How often do you have a drink containing alcohol? 0 Filed at: 03/29/2025 2204   2. How many drinks containing alcohol do you have on a typical day you are drinking?  0 Filed at: 03/29/2025 2204   3b. FEMALE Any Age, or MALE 65+: How often do you have 4 or more drinks on one occassion? 0 Filed at: 03/29/2025 2204   Audit-C Score 0 Filed at:  03/29/2025 2204   GEORGE: How many times in the past year have you...    Used an illegal drug or used a prescription medication for non-medical reasons? Never Filed at: 03/29/2025 2204          RAJAN Risk Score      Flowsheet Row Most Recent Value   Age >= 65 0 Filed at: 03/30/2025 0240   Known CAD (stenosis >= 50%) 0 Filed at: 03/30/2025 0240   Recent (<=24 hrs) Service Angina 1 Filed at: 03/30/2025 0240   ST Deviation >= 0.5 mm 0 Filed at: 03/30/2025 0240   3+ CAD Risk Factors (FHx, HTN, HLP, DM, Smoker) 1 Filed at: 03/30/2025 0240   Aspirin Use Past 7 Days 0 Filed at: 03/30/2025 0240   Elevated Cardiac Markers 1 Filed at: 03/30/2025 0240   RAJAN Risk Score (Calculated) 3 Filed at: 03/30/2025 0240                          History of Present Illness       Chief Complaint   Patient presents with    Chest Pain     Pt BIBA with a c/o chest pain as well as N/V since approx noon today. Pt reports recently starting Wegovy for weight loss.        Past Medical History:   Diagnosis Date    Hyperlipidemia     Hypertension     Morbid obesity (HCC)     Rheumatoid arthritis (HCC)       Past Surgical History:   Procedure Laterality Date    CARDIAC CATHETERIZATION N/A 3/31/2025    Procedure: Cardiac Catheterization;  Surgeon: Grant Rosales MD;  Location: [facility];  Service: Cardiology    CARDIAC CATHETERIZATION Left 3/31/2025    Procedure: Cardiac Left Heart Cath;  Surgeon: Grant Rosales MD;  Location: [facility];  Service: Cardiology    CARDIAC CATHETERIZATION N/A 3/31/2025    Procedure: Cardiac Coronary Angiogram;  Surgeon: Grant Rosales MD;  Location: [facility];  Service: Cardiology    TONSILLECTOMY        Family History   Problem Relation Age of Onset    Coronary artery disease Maternal Grandfather     Diabetes Maternal Grandfather       Social History     Tobacco Use    Smoking status: Never   Vaping Use    Vaping status: Never Used   Substance Use Topics    Alcohol use: Never    Drug  use: Never      E-Cigarette/Vaping    E-Cigarette Use Never User       E-Cigarette/Vaping Substances    Nicotine No     THC No     CBD No     Flavoring No     Other No     Unknown No       I have reviewed and agree with the history as documented.     Patient is a 59-year-old female with a past medical history significant for rheumatoid arthritis, morbid obesity, dyslipidemia, hypertension presenting to the emergency department for evaluation of nausea, vomiting.  Patient notes she woke up around noon and 8 some corn beef with coffee and Ritz crackers.  Shortly thereafter patient developed aggressive vomiting that has been persistent throughout the day.  She notes innumerable episodes of emesis.  She reports trying crackers, broth but has not been able to keep anything down.  She notes after vomiting multiple times she developed chest discomfort.  She reports it as an ache without radiation.  She notes discomfort with breathing that started after multiple episodes of vomiting.  This morning she woke up in her normal state of health and had none of the symptoms.  She denies any current abdominal pain.  Denies any current nausea or abdominal discomfort, denies fevers or chills.  Does note she is been on Wegovy x 6 weeks.        Review of Systems   Constitutional:  Negative for chills and fever.   HENT:  Negative for ear pain and sore throat.    Eyes:  Negative for pain and visual disturbance.   Respiratory:  Negative for cough and shortness of breath.    Cardiovascular:  Positive for chest pain. Negative for palpitations.   Gastrointestinal:  Positive for nausea and vomiting. Negative for abdominal pain.   Genitourinary:  Negative for dysuria and hematuria.   Musculoskeletal:  Negative for arthralgias and back pain.   Skin:  Negative for color change and rash.   Neurological:  Negative for seizures and syncope.   All other systems reviewed and are negative.          Objective       ED Triage Vitals   Temperature Pulse  Blood Pressure Respirations SpO2 Patient Position - Orthostatic VS   03/29/25 2236 03/29/25 2201 03/29/25 2201 03/29/25 2201 03/29/25 2201 03/29/25 2201   98.4 °F (36.9 °C) 79 161/79 18 99 % Lying      Temp Source Heart Rate Source BP Location FiO2 (%) Pain Score    03/29/25 2236 03/29/25 2201 03/29/25 2201 -- 03/29/25 2222    Oral Monitor Right arm  10 - Worst Possible Pain      Vitals      Date and Time Temp Pulse SpO2 Resp BP Pain Score FACES Pain Rating User   04/01/25 0830 -- 71 -- -- 127/62 -- -- AW   04/01/25 0733 98.4 °F (36.9 °C) 71 94 % -- 127/62 -- -- DII   04/01/25 0256 97.7 °F (36.5 °C) 72 90 % -- 131/59 -- -- DII   03/31/25 2300 -- 68 94 % -- 134/57 -- -- LC   03/31/25 2254 -- -- 92 % 19 -- -- -- LC   03/31/25 2254 -- 74 -- -- 134/57 -- -- DII   03/31/25 2131 -- -- -- 19 -- -- -- LC   03/31/25 2131 -- 75 91 % -- 143/53 -- -- DII   03/31/25 1930 -- -- 95 % -- -- No Pain -- DR   03/31/25 1922 -- -- -- 18 -- No Pain -- AR   03/31/25 1922 -- 79 93 % -- 147/92 -- -- DII   03/31/25 1844 -- -- -- 16 -- No Pain -- AR   03/31/25 1844 -- 80 96 % -- 150/95 -- -- DII   03/31/25 1821 -- -- -- 18 -- -- -- AR   03/31/25 1821 -- 84 97 % -- 150/97 -- -- DII   03/31/25 1821 -- -- -- -- -- No Pain -- AR   03/31/25 1802 -- 79 97 % -- -- -- -- LC   03/31/25 1802 -- -- -- 18 -- -- -- AR   03/31/25 1802 -- -- -- -- 154/102 -- -- DII   03/31/25 1800 -- -- -- -- -- No Pain -- AR   03/31/25 1733 -- 65 98 % -- 140/82 -- -- DII   03/31/25 1732 -- -- -- -- -- No Pain -- AR   03/31/25 1715 -- -- -- 23 -- -- -- LC   03/31/25 1715 -- -- -- -- -- No Pain -- AR   03/31/25 1715 -- 82 94 % -- 133/83 -- -- DII   03/31/25 1652 -- -- -- 22 -- -- -- LC   03/31/25 1652 -- -- -- -- -- No Pain -- AR   03/31/25 1652 98.5 °F (36.9 °C) 82 93 % -- 127/83 -- -- DII   03/31/25 1610 -- -- -- -- -- No Pain -- RA   03/31/25 1610 -- -- 98 % -- -- -- -- RA   03/31/25 1051 97 °F (36.1 °C) 72 98 % 20 129/69 2 -- DB   03/31/25 1036 -- -- -- -- -- 2 -- AR    03/31/25 1007 -- -- -- 18 -- -- -- AR   03/31/25 1007 -- 70 95 % -- 156/96 -- -- DII   03/31/25 1006 -- -- -- -- -- 8 -- AR   03/31/25 0748 -- -- -- 18 -- -- -- AR   03/31/25 0748 -- 78 93 % -- -- -- -- DII   03/31/25 0748 98.1 °F (36.7 °C) -- -- -- 164/101 -- -- DII   03/31/25 0243 98.3 °F (36.8 °C) 70 96 % 20 116/67 -- -- DII   03/31/25 0149 -- -- -- -- -- 8 -- JG   03/30/25 2255 98.5 °F (36.9 °C) 69 96 % -- 151/104 -- -- DII   03/30/25 2016 -- -- -- -- -- 6 -- JG   03/30/25 1930 -- -- -- -- -- 6 -- JG   03/30/25 1759 -- -- -- -- -- 6 -- GS   03/30/25 1552 97.9 °F (36.6 °C) 77 95 % -- 150/106 -- -- DII   03/30/25 1200 -- 83 96 % -- -- -- -- GS   03/30/25 1100 -- 85 96 % -- -- -- -- GS   03/30/25 1000 -- 78 95 % -- -- -- -- GS   03/30/25 0900 -- 92 95 % -- -- -- -- GS   03/30/25 0817 -- -- -- 17 -- -- -- GS   03/30/25 0817 98.9 °F (37.2 °C) 94 90 % -- 134/84 -- -- DII   03/30/25 0817 98.9 °F (37.2 °C) -- 90 % -- -- No Pain -- GS   03/30/25 0800 -- 90 93 % -- -- -- -- GS   03/30/25 0300 98.5 °F (36.9 °C) -- -- 19 -- -- -- JG   03/30/25 0259 -- -- -- -- -- 2 -- JG   03/30/25 0243 -- 110 90 % -- 118/79 -- -- DII   03/30/25 0118 -- -- 93 % -- -- -- -- ML   03/30/25 0115 -- 93  87 % Provider made aware - pt placed on 2ltrs O2 NC 20 168/76 -- -- ML   03/29/25 2353 -- -- -- -- -- 10 - Worst Possible Pain -- ML   03/29/25 2251 -- -- -- -- -- 10 - Worst Possible Pain -- ML   03/29/25 2236 98.4 °F (36.9 °C) -- -- -- -- -- -- ML   03/29/25 2222 -- -- -- -- -- 10 - Worst Possible Pain -- ML   03/29/25 2201 -- 79 99 % 18 161/79 -- -- ML            Physical Exam  Vitals and nursing note reviewed.   Constitutional:       General: She is not in acute distress.     Appearance: Normal appearance. She is obese. She is not ill-appearing, toxic-appearing or diaphoretic.      Comments: Patient sitting in NAD, morbidly obese   HENT:      Head: Normocephalic and atraumatic.   Eyes:      General: No scleral icterus.        Right eye: No  discharge.         Left eye: No discharge.      Extraocular Movements: Extraocular movements intact.      Conjunctiva/sclera: Conjunctivae normal.   Cardiovascular:      Rate and Rhythm: Normal rate.      Pulses: Normal pulses.      Heart sounds: Normal heart sounds. No murmur heard.     No friction rub. No gallop.      Comments: 2+ radial pulse  Pulmonary:      Effort: Pulmonary effort is normal. No respiratory distress.      Breath sounds: Normal breath sounds. No stridor. No wheezing, rhonchi or rales.   Abdominal:      General: Abdomen is flat. Bowel sounds are normal. There is no distension.      Palpations: Abdomen is soft.      Tenderness: There is no abdominal tenderness. There is no guarding or rebound.      Comments: Obese, no rigidity, guarding, rebound   Musculoskeletal:         General: No swelling. Normal range of motion.      Cervical back: Normal range of motion. No rigidity.   Skin:     General: Skin is warm and dry.      Capillary Refill: Capillary refill takes less than 2 seconds.      Coloration: Skin is not jaundiced.      Findings: No bruising or lesion.   Neurological:      General: No focal deficit present.      Mental Status: She is alert and oriented to person, place, and time. Mental status is at baseline.   Psychiatric:         Mood and Affect: Mood normal.         Behavior: Behavior normal.         Thought Content: Thought content normal.         Judgment: Judgment normal.         Results Reviewed       Procedure Component Value Units Date/Time    APTT [[id number]]  (Abnormal) Collected: 03/30/25 0649    Lab Status: Final result Specimen: Blood from Arm, Right Updated: 03/30/25 0718     PTT 51 seconds     HS Troponin I 4hr [[id number]]  (Abnormal) Collected: 03/30/25 0230    Lab Status: Final result Specimen: Blood from Arm, Right Updated: 03/30/25 0308     hs TnI 4hr 4,206 ng/L      Delta 4hr hsTnI 2,713 ng/L     HS Troponin I 2hr [[id number]]  (Abnormal) Collected: 03/30/25 0039    Lab  Status: Final result Specimen: Blood from Arm, Right Updated: 03/30/25 0131     hs TnI 2hr 2,659 ng/L      Delta 2hr hsTnI 1,166 ng/L     APTT six (6) hours after Heparin bolus/drip initiation or dosing change [[id number]]  (Normal) Collected: 03/30/25 0039    Lab Status: Final result Specimen: Blood from Arm, Right Updated: 03/30/25 0103     PTT 30 seconds     Protime-INR [[id number]]  (Normal) Collected: 03/30/25 0039    Lab Status: Final result Specimen: Blood from Arm, Right Updated: 03/30/25 0103     Protime 14.6 seconds      INR 1.07    Narrative:      INR Therapeutic Range    Indication                                             INR Range      Atrial Fibrillation                                               2.0-3.0  Hypercoagulable State                                    2.0.2.3  Left Ventricular Asist Device                            2.0-3.0  Mechanical Heart Valve                                  -    Aortic(with afib, MI, embolism, HF, LA enlargement,    and/or coagulopathy)                                     2.0-3.0 (2.5-3.5)     Mitral                                                             2.5-3.5  Prosthetic/Bioprosthetic Heart Valve               2.0-3.0  Venous thromboembolism (VTE: VT, PE        2.0-3.0    CBC [[id number]]  (Abnormal) Collected: 03/30/25 0039    Lab Status: Final result Specimen: Blood from Arm, Right Updated: 03/30/25 0051     WBC 12.60 Thousand/uL      RBC 4.26 Million/uL      Hemoglobin 12.1 g/dL      Hematocrit 37.5 %      MCV 88 fL      MCH 28.4 pg      MCHC 32.3 g/dL      RDW 14.4 %      Platelets 239 Thousands/uL      MPV 9.3 fL     Comprehensive metabolic panel [[id number]]  (Abnormal) Collected: 03/29/25 2222    Lab Status: Final result Specimen: Blood from Arm, Right Updated: 03/29/25 2300     Sodium 138 mmol/L      Potassium 3.7 mmol/L      Chloride 100 mmol/L      CO2 27 mmol/L      ANION GAP 11 mmol/L      BUN 25 mg/dL      Creatinine 0.94 mg/dL      Glucose 159  mg/dL      Calcium 10.1 mg/dL      AST 29 U/L      ALT 15 U/L      Alkaline Phosphatase 97 U/L      Total Protein 8.4 g/dL      Albumin 4.3 g/dL      Total Bilirubin 0.50 mg/dL      eGFR 67 ml/min/1.73sq m     Narrative:      [employer] guidelines for Chronic Kidney Disease (CKD):     Stage 1 with normal or high GFR (GFR > 90 mL/min/1.73 square meters)    Stage 2 Mild CKD (GFR = 60-89 mL/min/1.73 square meters)    Stage 3A Moderate CKD (GFR = 45-59 mL/min/1.73 square meters)    Stage 3B Moderate CKD (GFR = 30-44 mL/min/1.73 square meters)    Stage 4 Severe CKD (GFR = 15-29 mL/min/1.73 square meters)    Stage 5 End Stage CKD (GFR <15 mL/min/1.73 square meters)  Note: GFR calculation is accurate only with a steady state creatinine    Lipase [[id number]]  (Abnormal) Collected: 03/29/25 2222    Lab Status: Final result Specimen: Blood from Arm, Right Updated: 03/29/25 2300     Lipase <6 u/L     Lactic acid, plasma (w/reflex if result > 2.0) [[id number]]  (Normal) Collected: 03/29/25 2225    Lab Status: Final result Specimen: Blood from Arm, Right Updated: 03/29/25 2300     LACTIC ACID 1.5 mmol/L     Narrative:      Result may be elevated if tourniquet was used during collection.    HS Troponin 0hr (reflex protocol) [[id number]]  (Abnormal) Collected: 03/29/25 2222    Lab Status: Final result Specimen: Blood from Arm, Right Updated: 03/29/25 2258     hs TnI 0hr 1,493 ng/L     CBC and differential [[id number]]  (Abnormal) Collected: 03/29/25 2222    Lab Status: Final result Specimen: Blood Updated: 03/29/25 2236     WBC 13.20 Thousand/uL      RBC 4.43 Million/uL      Hemoglobin 12.5 g/dL      Hematocrit 38.7 %      MCV 87 fL      MCH 28.2 pg      MCHC 32.3 g/dL      RDW 14.4 %      MPV 9.6 fL      Platelets 264 Thousands/uL      nRBC 0 /100 WBCs      Segmented % 91 %      Immature Grans % 1 %      Lymphocytes % 5 %      Monocytes % 3 %      Eosinophils Relative 0 %      Basophils Relative 0 %       Absolute Neutrophils 12.02 Thousands/µL      Absolute Immature Grans 0.06 Thousand/uL      Absolute Lymphocytes 0.69 Thousands/µL      Absolute Monocytes 0.41 Thousand/µL      Eosinophils Absolute 0.00 Thousand/µL      Basophils Absolute 0.02 Thousands/µL     Narrative:      This is an appended report.  These results have been appended to a previously verified report.            RADIOLOGY RESULTS   Final Interpretation by  (03/31 0833)      CT chest abdomen pelvis w contrast   Final Interpretation by Cj Adkins MD (03/30 0903)   1. No acute findings.   2. Right adrenal nodule with calcifications suggesting prior hemorrhage. Correlate with prior studies if available.   3. Renal cysts, one suspected hemorrhagic. Recommend follow-up ultrasound.      The study was marked in EPIC for significant notification.      Virtual radiologic report was provided at 0142 hours on 3/30/2025.                     Workstation performed: [id number]             Procedures    ED Medication and Procedure Management   Prior to Admission Medications   Prescriptions Last Dose Informant Patient Reported? Taking?   Calcium Citrate-Vitamin D 250 mg-2.5 mcg tablet 3/29/2025 Self Yes Yes   Sig: Take 1 tablet by mouth 2 (two) times a day   Cranberry 1000 MG CAPS 3/29/2025 Self Yes Yes   Sig: Take 4,200 mg by mouth daily   HYDROcodone-acetaminophen (NORCO) 5-325 mg per tablet 3/29/2025 Self Yes Yes   Sig: Take 2 tablets by mouth every 4 (four) hours as needed for pain   Magnesium Citrate 200 MG TABS 3/29/2025 Self Yes Yes   Sig: Take 400 mg by mouth daily   Melatonin 10 MG TABS 3/29/2025 Self Yes Yes   Sig: Take 10 mg by mouth daily at bedtime   Potassium 99 MG TABS 3/29/2025 Self Yes Yes   Sig: Take 2 tablets by mouth daily at bedtime   Suzetrigine 50 MG TABS   Yes Yes   Sig: Take 50 mg by mouth every 12 (twelve) hours   Wegovy 0.5 MG/0.5ML   Yes No   Sig: once a week   aspirin 81 mg chewable tablet   Yes Yes   Sig: Chew 81 mg daily    atorvastatin (LIPITOR) 20 mg tablet 3/29/2025  Yes Yes   Sig: Take 1 tablet by mouth every evening   carvedilol (COREG) 12.5 mg tablet 3/29/2025  Yes Yes   Sig: Take 1 tablet by mouth 2 (two) times a day with meals   celecoxib (CeleBREX) 200 mg capsule 3/29/2025 Self Yes Yes   Sig: Take 200 mg by mouth 2 (two) times a day   esomeprazole (NexIUM) 40 MG capsule 3/29/2025  Yes Yes   Sig: Take 1 capsule by mouth daily before breakfast   fluticasone (FLONASE) 50 mcg/act nasal spray 3/29/2025 Self Yes Yes   Si spray into each nostril daily   furosemide (LASIX) 20 mg tablet   Yes No   Sig: Take 20 mg by mouth daily   levocetirizine (XYZAL) 5 MG tablet 3/29/2025 Self Yes Yes   Sig: Take 5 mg by mouth every evening   losartan-hydrochlorothiazide (HYZAAR) 100-25 MG per tablet 3/29/2025  Yes Yes   Sig: Take 1 tablet by mouth daily      Facility-Administered Medications: None     Current Discharge Medication List        CONTINUE these medications which have NOT CHANGED    Details   aspirin 81 mg chewable tablet Chew 81 mg daily      atorvastatin (LIPITOR) 20 mg tablet Take 1 tablet by mouth every evening      Calcium Citrate-Vitamin D 250 mg-2.5 mcg tablet Take 1 tablet by mouth 2 (two) times a day      carvedilol (COREG) 12.5 mg tablet Take 1 tablet by mouth 2 (two) times a day with meals      celecoxib (CeleBREX) 200 mg capsule Take 200 mg by mouth 2 (two) times a day      Cranberry 1000 MG CAPS Take 4,200 mg by mouth daily      esomeprazole (NexIUM) 40 MG capsule Take 1 capsule by mouth daily before breakfast      fluticasone (FLONASE) 50 mcg/act nasal spray 1 spray into each nostril daily      HYDROcodone-acetaminophen (NORCO) 5-325 mg per tablet Take 2 tablets by mouth every 4 (four) hours as needed for pain      levocetirizine (XYZAL) 5 MG tablet Take 5 mg by mouth every evening      losartan-hydrochlorothiazide (HYZAAR) 100-25 MG per tablet Take 1 tablet by mouth daily      Magnesium Citrate 200 MG TABS Take 400 mg  by mouth daily      Melatonin 10 MG TABS Take 10 mg by mouth daily at bedtime      Potassium 99 MG TABS Take 2 tablets by mouth daily at bedtime      Suzetrigine 50 MG TABS Take 50 mg by mouth every 12 (twelve) hours      furosemide (LASIX) 20 mg tablet Take 20 mg by mouth daily      Wegovy 0.5 MG/0.5ML once a week           No discharge procedures on file.  ED SEPSIS DOCUMENTATION   Time reflects when diagnosis was documented in both MDM as applicable and the Disposition within this note       Time User Action Codes Description Comment    3/30/2025  1:47 AM Ferny Hoang [R11.2] Nausea & vomiting     3/30/2025  1:47 AM Ferny Hoang Add [R07.9] Chest pain     3/30/2025  1:47 AM Ferny Hoang [I21.4] NSTEMI (non-ST elevated myocardial infarction) (HCC)     3/30/2025 11:17 AM Barber Reeves Add [R79.89] Elevated troponin     3/30/2025  4:00 PM Lj Saul Modify [R07.9] Chest pain     3/30/2025  4:00 PM Lj Saul Modify [R79.89] Elevated troponin     4/1/2025  9:06 AM Ferny Hoang Add [E27.9] Adrenal nodule (HCC)     4/1/2025  9:07 AM Ferny Hoang [N28.1] Renal cyst                  Ferny Hoang DO  04/01/25 0907

## 2025-03-30 NOTE — ASSESSMENT & PLAN NOTE
Experienced episodes of emesis followed by chest pain.  Troponin elevated at 5457.  EKG's reviewed.  TTE pending.  Discussed consideration for further ischemic evaluation.  Patient wishes to proceed with cardiac catheterization.  Plan for cardiac catheterization tomorrow.  Continue ASA, Lipitor, Coreg, and heparin gtt.    Discussed the indications, alternatives, risks and benefit of cardiac catheterization and possible PCI. The procedure risks, benefits, and complications (including but not limited to bleeding, infection, arrhythmia, nephrotoxicity, vessel injury, myocardial infarction, CVA, and death) were reviewed.  Patient is alert and oriented x3 and wishes to proceed. All questions answered.

## 2025-03-30 NOTE — H&P
H&P - Hospitalist   Name: Sara Walker 59 y.o. female I MRN: [id number]  Unit/Bed#: -01 I Date of Admission: 3/29/2025   Date of Service: 3/30/2025 I Hospital Day: 0     Assessment & Plan  Elevated troponin  Presenting with episode of vomiting and then chest pain following, currently chest pain has subsided with pain medication in the ED  EKG x 2 revealing normal sinus rhythm  CT chest abdomen pelvis negative for acute dissection or aneurysm  Initial troponin noted at 1493, repeat 2659, will trend until peak  Telemetry  Will keep n.p.o., cardiology consulted  Initiated on IV heparin drip with ACS protocol in the ED, will continue  Given 1 loading dose aspirin 325 mg  Continue home 81 mg aspirin daily, statin and Coreg twice daily  As needed sublingual nitroglycerin, IV morphine if chest pain were to return  Abnormal CT of the abdomen  Incidental finding on CT chest abdomen pelvis of 39 mm exophytic left upper pole renal cyst and 28 mm right adrenal nodule, no prior studies to compare  Can follow-up outpatient for repeat imaging      VTE Pharmacologic Prophylaxis: VTE Score: 5 High Risk (Score >/= 5) - Pharmacological DVT Prophylaxis Ordered: heparin drip. Sequential Compression Devices Ordered.  Code Status: Level 1 - Full Code   Discussion with family:  pt.     Anticipated Length of Stay: Patient will be admitted on an inpatient basis with an anticipated length of stay of greater than 2 midnights secondary to see above.    History of Present Illness   Chief Complaint:    Chief Complaint   Patient presents with    Chest Pain     Pt BIBA with a c/o chest pain as well as N/V since approx noon today. Pt reports recently starting Wegovy for weight loss.         Sara Walker is a 59 y.o. female with a PMH of hypertension, hyperlipidemia, morbid obesity, RA, CKD who presents with complaint of a sudden onset of midsternal severe chest pain that has been constant this started earlier this evening after  vomiting episode.  Patient reports she ate dinner and then had an episode of vomiting followed by chest pain that was persistent so she came to the ED.  She currently reports after receiving medication the ED her chest pain has become minimal.  Denies dyspnea, shortness of breath, abdominal pain..    Review of Systems   Constitutional:  Negative for activity change.   Respiratory:  Negative for shortness of breath.    Cardiovascular:  Positive for chest pain.   Gastrointestinal:  Positive for nausea and vomiting. Negative for abdominal pain.       Historical Information   Past Medical History:   Diagnosis Date    Hyperlipidemia     Hypertension     Morbid obesity (HCC)     Rheumatoid arthritis (HCC)      Past Surgical History:   Procedure Laterality Date    TONSILLECTOMY       Social History     Tobacco Use    Smoking status: Never    Smokeless tobacco: Not on file   Vaping Use    Vaping status: Never Used   Substance and Sexual Activity    Alcohol use: Never    Drug use: Never    Sexual activity: Not on file     E-Cigarette/Vaping    E-Cigarette Use Never User      E-Cigarette/Vaping Substances    Nicotine No     THC No     CBD No     Flavoring No     Other No     Unknown No      Family history non-contributory  Social History:  Marital Status: /Civil Union     Patient Pre-hospital Living Situation: Home  Patient Pre-hospital Level of Mobility: walks  Patient Pre-hospital Diet Restrictions: n/a    Meds/Allergies   I have reviewed home medications per review of chart.   Prior to Admission medications    Medication Sig Start Date End Date Taking? Authorizing Provider   aspirin 81 mg chewable tablet Chew 81 mg daily   Yes Historical Provider, MD   atorvastatin (LIPITOR) 20 mg tablet Take 1 tablet by mouth every evening 2/6/23  Yes Historical Provider, MD   Calcium Citrate-Vitamin D 250 mg-2.5 mcg tablet Take 1 tablet by mouth 2 (two) times a day   Yes Historical Provider, MD   carvedilol (COREG) 12.5 mg tablet  Take 1 tablet by mouth 2 (two) times a day with meals 5/8/23  Yes Historical Provider, MD   celecoxib (CeleBREX) 200 mg capsule Take 200 mg by mouth 2 (two) times a day   Yes Historical Provider, MD   Cranberry 1000 MG CAPS Take 4,200 mg by mouth daily   Yes Historical Provider, MD   esomeprazole (NexIUM) 40 MG capsule Take 1 capsule by mouth daily before breakfast 4/17/23  Yes Historical Provider, MD   fluticasone (FLONASE) 50 mcg/act nasal spray 1 spray into each nostril daily   Yes Historical Provider, MD   HYDROcodone-acetaminophen (NORCO) 5-325 mg per tablet Take 2 tablets by mouth every 4 (four) hours as needed for pain   Yes Historical Provider, MD   levocetirizine (XYZAL) 5 MG tablet Take 5 mg by mouth every evening   Yes Historical Provider, MD   losartan-hydrochlorothiazide (HYZAAR) 100-25 MG per tablet Take 1 tablet by mouth daily 4/10/23  Yes Historical Provider, MD   Magnesium Citrate 200 MG TABS Take 400 mg by mouth daily   Yes Historical Provider, MD   Melatonin 10 MG TABS Take 10 mg by mouth daily at bedtime   Yes Historical Provider, MD   Potassium 99 MG TABS Take 2 tablets by mouth daily at bedtime   Yes Historical Provider, MD   Suzetrigine 50 MG TABS Take 50 mg by mouth every 12 (twelve) hours 3/24/25  Yes Historical Provider, MD   fexofenadine (ALLEGRA) 180 MG tablet Take 180 mg by mouth daily  3/30/25 Yes Historical Provider, MD   furosemide (LASIX) 20 mg tablet Take 20 mg by mouth daily    Historical Provider, MD   Wegovy 0.5 MG/0.5ML once a week    Historical Provider, MD     Allergies   Allergen Reactions    Mangifera Indica Swelling    Strawberry Extract - Food Allergy Hives       Objective :  Temp:  [98.4 °F (36.9 °C)-98.5 °F (36.9 °C)] 98.5 °F (36.9 °C)  HR:  [] 110  BP: (118-168)/(76-79) 118/79  Resp:  [18-20] 19  SpO2:  [87 %-99 %] 90 %  O2 Device: Nasal cannula  Nasal Cannula O2 Flow Rate (L/min):  [3 L/min] 3 L/min    Physical Exam  Vitals and nursing note reviewed.    Constitutional:       General: She is not in acute distress.     Appearance: She is obese.   HENT:      Head: Normocephalic.   Cardiovascular:      Rate and Rhythm: Normal rate and regular rhythm.   Pulmonary:      Effort: Pulmonary effort is normal. No respiratory distress.      Breath sounds: Normal breath sounds.   Abdominal:      Palpations: Abdomen is soft.      Tenderness: There is no abdominal tenderness.   Musculoskeletal:         General: No tenderness.   Skin:     General: Skin is warm and dry.      Comments: Significant dry skin noted over bilateral lower extremities which patient reports is her baseline with psoriasis   Neurological:      General: No focal deficit present.      Mental Status: She is alert and oriented to person, place, and time.   Psychiatric:         Mood and Affect: Mood normal.         Behavior: Behavior normal.         Thought Content: Thought content normal.          Lines/Drains:            Lab Results: I have reviewed the following results:  Results from last 7 days   Lab Units 03/30/25  0039 03/29/25  2222   WBC Thousand/uL 12.60* 13.20*   HEMOGLOBIN g/dL 12.1 12.5   HEMATOCRIT % 37.5 38.7   PLATELETS Thousands/uL 239 264   SEGS PCT %  --  91*   LYMPHO PCT %  --  5*   MONO PCT %  --  3*   EOS PCT %  --  0     Results from last 7 days   Lab Units 03/29/25  2222   SODIUM mmol/L 138   POTASSIUM mmol/L 3.7   CHLORIDE mmol/L 100   CO2 mmol/L 27   BUN mg/dL 25   CREATININE mg/dL 0.94   ANION GAP mmol/L 11   CALCIUM mg/dL 10.1   ALBUMIN g/dL 4.3   TOTAL BILIRUBIN mg/dL 0.50   ALK PHOS U/L 97   ALT U/L 15   AST U/L 29   GLUCOSE RANDOM mg/dL 159*     Results from last 7 days   Lab Units 03/30/25  0039   INR  1.07         Lab Results   Component Value Date    HGBA1C 5.6 03/28/2025    HGBA1C 5.6 04/04/2024    HGBA1C 5.8 (H) 04/26/2023     Results from last 7 days   Lab Units 03/29/25  2225   LACTIC ACID mmol/L 1.5       Imaging Results Review: I reviewed radiology reports from this  admission including: CT chest and CT abdomen/pelvis.  Other Study Results Review: EKG was personally reviewed and my interpretation is: Personally Reviewed.  Normal sinus rhythm..    Administrative Statements   I have spent a total time of 75 minutes in caring for this patient on the day of the visit/encounter including Diagnostic results, Prognosis, Importance of tx compliance, Risk factor reductions, Impressions, Counseling / Coordination of care, Documenting in the medical record, Reviewing/placing orders in the medical record (including tests, medications, and/or procedures), and Obtaining or reviewing history  .    ** Please Note: This note has been constructed using a voice recognition system. **

## 2025-03-30 NOTE — ASSESSMENT & PLAN NOTE
Presenting with episode of vomiting and then chest pain following, currently chest pain has subsided with pain medication in the ED  EKG x 2 revealing normal sinus rhythm  CT chest abdomen pelvis negative for acute dissection or aneurysm  Initial troponin noted at 1493, repeat 2659, will trend until peak  Telemetry  Will keep n.p.o., cardiology consulted  Initiated on IV heparin drip with ACS protocol in the ED, will continue  Given 1 loading dose aspirin 325 mg  Continue home 81 mg aspirin daily, statin and Coreg twice daily  As needed sublingual nitroglycerin, IV morphine if chest pain were to return

## 2025-03-30 NOTE — ASSESSMENT & PLAN NOTE
Incidental finding on CT chest abdomen pelvis of 39 mm exophytic left upper pole renal cyst and 28 mm right adrenal nodule, no prior studies to compare  Can follow-up outpatient for repeat imaging

## 2025-03-31 ENCOUNTER — APPOINTMENT (INPATIENT)
Dept: NON INVASIVE DIAGNOSTICS | Facility: HOSPITAL | Age: 60
DRG: 287 | End: 2025-03-31
Payer: COMMERCIAL

## 2025-03-31 PROBLEM — E87.6 HYPOKALEMIA: Status: ACTIVE | Noted: 2025-03-31

## 2025-03-31 LAB
ALBUMIN SERPL BCG-MCNC: 3.6 G/DL (ref 3.5–5)
ALP SERPL-CCNC: 75 U/L (ref 34–104)
ALT SERPL W P-5'-P-CCNC: 18 U/L (ref 7–52)
ANION GAP SERPL CALCULATED.3IONS-SCNC: 10 MMOL/L (ref 4–13)
ANION GAP SERPL CALCULATED.3IONS-SCNC: 9 MMOL/L (ref 4–13)
APTT PPP: 56 SECONDS (ref 23–34)
APTT PPP: 96 SECONDS (ref 23–34)
AST SERPL W P-5'-P-CCNC: 46 U/L (ref 13–39)
ATRIAL RATE: 101 BPM
BASOPHILS # BLD AUTO: 0.02 THOUSANDS/ÂΜL (ref 0–0.1)
BASOPHILS NFR BLD AUTO: 0 % (ref 0–1)
BILIRUB SERPL-MCNC: 0.7 MG/DL (ref 0.2–1)
BUN SERPL-MCNC: 19 MG/DL (ref 5–25)
BUN SERPL-MCNC: 19 MG/DL (ref 5–25)
CALCIUM SERPL-MCNC: 8.8 MG/DL (ref 8.4–10.2)
CALCIUM SERPL-MCNC: 8.9 MG/DL (ref 8.4–10.2)
CHLORIDE SERPL-SCNC: 103 MMOL/L (ref 96–108)
CHLORIDE SERPL-SCNC: 104 MMOL/L (ref 96–108)
CO2 SERPL-SCNC: 25 MMOL/L (ref 21–32)
CO2 SERPL-SCNC: 27 MMOL/L (ref 21–32)
CREAT SERPL-MCNC: 0.89 MG/DL (ref 0.6–1.3)
CREAT SERPL-MCNC: 0.92 MG/DL (ref 0.6–1.3)
EOSINOPHIL # BLD AUTO: 0.01 THOUSAND/ÂΜL (ref 0–0.61)
EOSINOPHIL NFR BLD AUTO: 0 % (ref 0–6)
ERYTHROCYTE [DISTWIDTH] IN BLOOD BY AUTOMATED COUNT: 14.7 % (ref 11.6–15.1)
GFR SERPL CREATININE-BSD FRML MDRD: 69 ML/MIN/1.73SQ M
GFR SERPL CREATININE-BSD FRML MDRD: 72 ML/MIN/1.73SQ M
GLUCOSE SERPL-MCNC: 84 MG/DL (ref 65–140)
GLUCOSE SERPL-MCNC: 88 MG/DL (ref 65–140)
HCT VFR BLD AUTO: 33.5 % (ref 34.8–46.1)
HGB BLD-MCNC: 10.8 G/DL (ref 11.5–15.4)
IMM GRANULOCYTES # BLD AUTO: 0.07 THOUSAND/UL (ref 0–0.2)
IMM GRANULOCYTES NFR BLD AUTO: 1 % (ref 0–2)
LYMPHOCYTES # BLD AUTO: 1.96 THOUSANDS/ÂΜL (ref 0.6–4.47)
LYMPHOCYTES NFR BLD AUTO: 18 % (ref 14–44)
MAGNESIUM SERPL-MCNC: 1.9 MG/DL (ref 1.9–2.7)
MCH RBC QN AUTO: 28.6 PG (ref 26.8–34.3)
MCHC RBC AUTO-ENTMCNC: 32.2 G/DL (ref 31.4–37.4)
MCV RBC AUTO: 89 FL (ref 82–98)
MONOCYTES # BLD AUTO: 1.19 THOUSAND/ÂΜL (ref 0.17–1.22)
MONOCYTES NFR BLD AUTO: 11 % (ref 4–12)
NEUTROPHILS # BLD AUTO: 7.6 THOUSANDS/ÂΜL (ref 1.85–7.62)
NEUTS SEG NFR BLD AUTO: 70 % (ref 43–75)
NRBC BLD AUTO-RTO: 0 /100 WBCS
P AXIS: 53 DEGREES
PHOSPHATE SERPL-MCNC: 2.8 MG/DL (ref 2.7–4.5)
PLATELET # BLD AUTO: 200 THOUSANDS/UL (ref 149–390)
PMV BLD AUTO: 9.4 FL (ref 8.9–12.7)
POTASSIUM SERPL-SCNC: 2.7 MMOL/L (ref 3.5–5.3)
POTASSIUM SERPL-SCNC: 3.2 MMOL/L (ref 3.5–5.3)
PR INTERVAL: 200 MS
PROT SERPL-MCNC: 6.7 G/DL (ref 6.4–8.4)
QRS AXIS: 81 DEGREES
QRSD INTERVAL: 82 MS
QT INTERVAL: 366 MS
QTC INTERVAL: 475 MS
RBC # BLD AUTO: 3.77 MILLION/UL (ref 3.81–5.12)
SODIUM SERPL-SCNC: 139 MMOL/L (ref 135–147)
SODIUM SERPL-SCNC: 139 MMOL/L (ref 135–147)
T WAVE AXIS: 57 DEGREES
VENTRICULAR RATE: 101 BPM
WBC # BLD AUTO: 10.85 THOUSAND/UL (ref 4.31–10.16)

## 2025-03-31 PROCEDURE — 85025 COMPLETE CBC W/AUTO DIFF WBC: CPT | Performed by: INTERNAL MEDICINE

## 2025-03-31 PROCEDURE — 93010 ELECTROCARDIOGRAM REPORT: CPT | Performed by: INTERNAL MEDICINE

## 2025-03-31 PROCEDURE — 84100 ASSAY OF PHOSPHORUS: CPT

## 2025-03-31 PROCEDURE — 80053 COMPREHEN METABOLIC PANEL: CPT | Performed by: INTERNAL MEDICINE

## 2025-03-31 PROCEDURE — 99152 MOD SED SAME PHYS/QHP 5/>YRS: CPT | Performed by: INTERNAL MEDICINE

## 2025-03-31 PROCEDURE — 99233 SBSQ HOSP IP/OBS HIGH 50: CPT | Performed by: INTERNAL MEDICINE

## 2025-03-31 PROCEDURE — 85730 THROMBOPLASTIN TIME PARTIAL: CPT | Performed by: INTERNAL MEDICINE

## 2025-03-31 PROCEDURE — 93458 L HRT ARTERY/VENTRICLE ANGIO: CPT | Performed by: INTERNAL MEDICINE

## 2025-03-31 PROCEDURE — C1894 INTRO/SHEATH, NON-LASER: HCPCS | Performed by: INTERNAL MEDICINE

## 2025-03-31 PROCEDURE — C1769 GUIDE WIRE: HCPCS | Performed by: INTERNAL MEDICINE

## 2025-03-31 PROCEDURE — 83735 ASSAY OF MAGNESIUM: CPT

## 2025-03-31 PROCEDURE — 80048 BASIC METABOLIC PNL TOTAL CA: CPT | Performed by: INTERNAL MEDICINE

## 2025-03-31 RX ORDER — HYDROCODONE BITARTRATE AND ACETAMINOPHEN 5; 325 MG/1; MG/1
2 TABLET ORAL EVERY 6 HOURS PRN
Refills: 0 | Status: DISCONTINUED | OUTPATIENT
Start: 2025-03-31 | End: 2025-04-01 | Stop reason: HOSPADM

## 2025-03-31 RX ORDER — POTASSIUM CHLORIDE 14.9 MG/ML
20 INJECTION INTRAVENOUS
Status: ACTIVE | OUTPATIENT
Start: 2025-03-31 | End: 2025-03-31

## 2025-03-31 RX ORDER — VERAPAMIL HCL 2.5 MG/ML
AMPUL (ML) INTRAVENOUS CODE/TRAUMA/SEDATION MEDICATION
Status: DISCONTINUED | OUTPATIENT
Start: 2025-03-31 | End: 2025-03-31 | Stop reason: HOSPADM

## 2025-03-31 RX ORDER — SODIUM CHLORIDE 9 MG/ML
75 INJECTION, SOLUTION INTRAVENOUS CONTINUOUS
Status: DISPENSED | OUTPATIENT
Start: 2025-03-31 | End: 2025-03-31

## 2025-03-31 RX ORDER — LIDOCAINE WITH 8.4% SOD BICARB 0.9%(10ML)
SYRINGE (ML) INJECTION CODE/TRAUMA/SEDATION MEDICATION
Status: DISCONTINUED | OUTPATIENT
Start: 2025-03-31 | End: 2025-03-31 | Stop reason: HOSPADM

## 2025-03-31 RX ORDER — HEPARIN SODIUM 1000 [USP'U]/ML
INJECTION, SOLUTION INTRAVENOUS; SUBCUTANEOUS CODE/TRAUMA/SEDATION MEDICATION
Status: DISCONTINUED | OUTPATIENT
Start: 2025-03-31 | End: 2025-03-31 | Stop reason: HOSPADM

## 2025-03-31 RX ORDER — LIDOCAINE 50 MG/G
1 PATCH TOPICAL DAILY
Status: DISCONTINUED | OUTPATIENT
Start: 2025-03-31 | End: 2025-04-01 | Stop reason: HOSPADM

## 2025-03-31 RX ORDER — POTASSIUM CHLORIDE 1500 MG/1
20 TABLET, EXTENDED RELEASE ORAL 2 TIMES DAILY
Status: DISCONTINUED | OUTPATIENT
Start: 2025-03-31 | End: 2025-03-31

## 2025-03-31 RX ORDER — LIDOCAINE 50 MG/G
1 PATCH TOPICAL DAILY PRN
Status: DISCONTINUED | OUTPATIENT
Start: 2025-03-31 | End: 2025-04-01 | Stop reason: HOSPADM

## 2025-03-31 RX ORDER — MIDAZOLAM HYDROCHLORIDE 2 MG/2ML
INJECTION, SOLUTION INTRAMUSCULAR; INTRAVENOUS CODE/TRAUMA/SEDATION MEDICATION
Status: DISCONTINUED | OUTPATIENT
Start: 2025-03-31 | End: 2025-03-31 | Stop reason: HOSPADM

## 2025-03-31 RX ORDER — BENZONATATE 100 MG/1
100 CAPSULE ORAL 3 TIMES DAILY PRN
Status: DISCONTINUED | OUTPATIENT
Start: 2025-03-31 | End: 2025-04-01 | Stop reason: HOSPADM

## 2025-03-31 RX ORDER — FENTANYL CITRATE 50 UG/ML
INJECTION, SOLUTION INTRAMUSCULAR; INTRAVENOUS CODE/TRAUMA/SEDATION MEDICATION
Status: DISCONTINUED | OUTPATIENT
Start: 2025-03-31 | End: 2025-03-31 | Stop reason: HOSPADM

## 2025-03-31 RX ORDER — POTASSIUM CHLORIDE 1500 MG/1
40 TABLET, EXTENDED RELEASE ORAL 2 TIMES DAILY
Status: COMPLETED | OUTPATIENT
Start: 2025-03-31 | End: 2025-03-31

## 2025-03-31 RX ADMIN — PANTOPRAZOLE SODIUM 40 MG: 40 TABLET, DELAYED RELEASE ORAL at 08:36

## 2025-03-31 RX ADMIN — MORPHINE SULFATE 2 MG: 2 INJECTION, SOLUTION INTRAMUSCULAR; INTRAVENOUS at 01:49

## 2025-03-31 RX ADMIN — ONDANSETRON 4 MG: 2 INJECTION INTRAMUSCULAR; INTRAVENOUS at 19:25

## 2025-03-31 RX ADMIN — POTASSIUM CHLORIDE 40 MEQ: 1500 TABLET, EXTENDED RELEASE ORAL at 17:48

## 2025-03-31 RX ADMIN — SODIUM CHLORIDE 75 ML/HR: 0.9 INJECTION, SOLUTION INTRAVENOUS at 07:00

## 2025-03-31 RX ADMIN — ONDANSETRON 4 MG: 2 INJECTION INTRAMUSCULAR; INTRAVENOUS at 08:40

## 2025-03-31 RX ADMIN — POTASSIUM CHLORIDE 40 MEQ: 1500 TABLET, EXTENDED RELEASE ORAL at 12:02

## 2025-03-31 RX ADMIN — ATORVASTATIN CALCIUM 20 MG: 20 TABLET, FILM COATED ORAL at 17:47

## 2025-03-31 RX ADMIN — HYDROCODONE BITARTRATE AND ACETAMINOPHEN 2 TABLET: 5; 325 TABLET ORAL at 22:24

## 2025-03-31 RX ADMIN — Medication 2 G: at 17:48

## 2025-03-31 RX ADMIN — CARVEDILOL 12.5 MG: 12.5 TABLET, FILM COATED ORAL at 08:36

## 2025-03-31 RX ADMIN — MORPHINE SULFATE 2 MG: 2 INJECTION, SOLUTION INTRAMUSCULAR; INTRAVENOUS at 10:06

## 2025-03-31 RX ADMIN — HEPARIN SODIUM 17.1 UNITS/KG/HR: 10000 INJECTION, SOLUTION INTRAVENOUS at 02:50

## 2025-03-31 RX ADMIN — LOSARTAN POTASSIUM 100 MG: 50 TABLET, FILM COATED ORAL at 08:36

## 2025-03-31 RX ADMIN — HEPARIN SODIUM 2000 UNITS: 1000 INJECTION, SOLUTION INTRAVENOUS; SUBCUTANEOUS at 02:46

## 2025-03-31 RX ADMIN — SODIUM CHLORIDE 444 ML: 0.9 INJECTION, SOLUTION INTRAVENOUS at 04:47

## 2025-03-31 RX ADMIN — LIDOCAINE 5% 1 PATCH: 700 PATCH TOPICAL at 10:08

## 2025-03-31 RX ADMIN — POTASSIUM CHLORIDE 20 MEQ: 200 INJECTION, SOLUTION INTRAVENOUS at 12:02

## 2025-03-31 RX ADMIN — Medication 2 G: at 22:24

## 2025-03-31 RX ADMIN — SODIUM CHLORIDE 75 ML/HR: 0.9 INJECTION, SOLUTION INTRAVENOUS at 16:55

## 2025-03-31 RX ADMIN — LIDOCAINE 5% 1 PATCH: 700 PATCH TOPICAL at 22:24

## 2025-03-31 RX ADMIN — LORATADINE 10 MG: 10 TABLET ORAL at 08:36

## 2025-03-31 RX ADMIN — FLUTICASONE PROPIONATE 1 SPRAY: 50 SPRAY, METERED NASAL at 17:48

## 2025-03-31 RX ADMIN — BENZONATATE 100 MG: 100 CAPSULE ORAL at 22:24

## 2025-03-31 RX ADMIN — ASPIRIN 81 MG: 81 TABLET, CHEWABLE ORAL at 08:36

## 2025-03-31 NOTE — NURSING NOTE
Patient returned to unit post cardiac cath. Patient is alert and orientedx4, no reports of pain or discomfort. NERISSA Tenorio informed.

## 2025-03-31 NOTE — PLAN OF CARE
Problem: PAIN - ADULT  Goal: Verbalizes/displays adequate comfort level or baseline comfort level  Description: Interventions:- Encourage patient to monitor pain and request assistance- Assess pain using appropriate pain scale- Administer analgesics based on type and severity of pain and evaluate response- Implement non-pharmacological measures as appropriate and evaluate response- Consider cultural and social influences on pain and pain management- Notify physician/advanced practitioner if interventions unsuccessful or patient reports new pain  Outcome: Progressing     Problem: CARDIOVASCULAR - ADULT  Goal: Maintains optimal cardiac output and hemodynamic stability  Description: INTERVENTIONS:- Monitor I/O, vital signs and rhythm- Monitor for S/S and trends of decreased cardiac output- Administer and titrate ordered vasoactive medications to optimize hemodynamic stability- Assess quality of pulses, skin color and temperature- Assess for signs of decreased coronary artery perfusion- Instruct patient to report change in severity of symptoms  Outcome: Progressing  Goal: Absence of cardiac dysrhythmias or at baseline rhythm  Description: INTERVENTIONS:- Continuous cardiac monitoring, vital signs, obtain 12 lead EKG if ordered- Administer antiarrhythmic and heart rate control medications as ordered- Monitor electrolytes and administer replacement therapy as ordered  Outcome: Progressing

## 2025-03-31 NOTE — PROGRESS NOTES
Progress Note - Hospitalist   Name: Sara Walker 59 y.o. female I MRN: [id number]  Unit/Bed#: [facility] ROOM I Date of Admission: 3/29/2025   Date of Service: 3/31/2025 I Hospital Day: 1    Assessment & Plan  Elevated troponin  Presenting with episode of vomiting and then chest pain following, currently chest pain has subsided with pain medication in the ED  EKG x 2 revealing normal sinus rhythm  CT chest abdomen pelvis negative for acute dissection or aneurysm  Initial troponin noted at 1493, repeat 2659, will trend until peak  Telemetry    Pending cardiac cath today PM  Continue aspirin, statin, beta-blocker  Continue to monitor  Cardiology following  Heparin gtt  Hypokalemia  K 2.7 on 3/31  Being replaced  Telemetry monitor  Abnormal CT of the abdomen  Incidental finding on CT chest abdomen pelvis of 39 mm exophytic left upper pole renal cyst and 28 mm right adrenal nodule, no prior studies to compare  Can follow-up outpatient for repeat imaging  OP follow up  Primary hypertension  Continue carvedilol and losartan  Mixed hyperlipidemia  Continue statin    VTE Pharmacologic Prophylaxis: VTE Score: 5 Heparin gtt    Mobility:   Basic Mobility Inpatient Raw Score: 22  JH-HLM Goal: 7: Walk 25 feet or more  JH-HLM Achieved: 6: Walk 10 steps or more  JH-HLM Goal NOT achieved. Continue with multidisciplinary rounding and encourage appropriate mobility to improve upon JH-HLM goals.    Patient Centered Rounds: I performed bedside rounds with nursing staff today.   Discussions with Specialists or Other Care Team Provider: Discussed with care management team    Education and Discussions with Family / Patient: Family at bedside    Current Length of Stay: 1 day(s)  Current Patient Status: Inpatient   Certification Statement: The patient will continue to require additional inpatient hospital stay due to hypokalemia, need for cath  Discharge Plan: Anticipate discharge tomorrow to home.    Code Status: Level 1 - Full  Code    Subjective   Doing well, no angina but mentions chest wall pain reproducible on the L    Objective :  Temp:  [97 °F (36.1 °C)-98.5 °F (36.9 °C)] 97 °F (36.1 °C)  HR:  [69-78] 72  BP: (116-164)/() 129/69  Resp:  [18-20] 20  SpO2:  [93 %-98 %] 98 %  O2 Device: None (Room air)    Body mass index is 63.55 kg/m².     Input and Output Summary (last 24 hours):   No intake or output data in the 24 hours ending 03/31/25 1507    Physical Exam  Vitals and nursing note reviewed.   Constitutional:       Appearance: Normal appearance. She is normal weight.      Comments: Female in bed, awake   HENT:      Head: Normocephalic and atraumatic.      Right Ear: External ear normal.      Left Ear: External ear normal.      Nose: Nose normal. No congestion.      Mouth/Throat:      Mouth: Mucous membranes are moist.      Pharynx: Oropharynx is clear. No oropharyngeal exudate or posterior oropharyngeal erythema.   Eyes:      General: No scleral icterus.        Right eye: No discharge.         Left eye: No discharge.      Extraocular Movements: Extraocular movements intact.      Conjunctiva/sclera: Conjunctivae normal.      Pupils: Pupils are equal, round, and reactive to light.   Cardiovascular:      Rate and Rhythm: Normal rate and regular rhythm.      Pulses: Normal pulses.      Heart sounds: Normal heart sounds. No murmur heard.     No friction rub. No gallop.   Pulmonary:      Effort: Pulmonary effort is normal. No respiratory distress.      Breath sounds: Normal breath sounds. No stridor. No wheezing, rhonchi or rales.   Chest:      Chest wall: No tenderness.   Abdominal:      General: Abdomen is flat. Bowel sounds are normal. There is no distension.      Palpations: Abdomen is soft. There is no mass.      Tenderness: There is no abdominal tenderness. There is no guarding or rebound.   Musculoskeletal:         General: No swelling, tenderness, deformity or signs of injury. Normal range of motion.      Cervical back:  Normal range of motion and neck supple. No rigidity. No muscular tenderness.   Skin:     General: Skin is warm and dry.      Capillary Refill: Capillary refill takes less than 2 seconds.      Coloration: Skin is not jaundiced or pale.      Findings: No bruising, erythema, lesion or rash.   Neurological:      General: No focal deficit present.      Mental Status: She is alert and oriented to person, place, and time. Mental status is at baseline.      Cranial Nerves: No cranial nerve deficit.      Sensory: No sensory deficit.      Motor: No weakness.      Coordination: Coordination normal.   Psychiatric:         Mood and Affect: Mood normal.         Behavior: Behavior normal.         Thought Content: Thought content normal.         Judgment: Judgment normal.           Lines/Drains:        Telemetry:  Telemetry Orders (From admission, onward)               24 Hour Telemetry Monitoring  Continuous x 24 Hours (Telem)        Expiring   Question:  Reason for 24 Hour Telemetry  Answer:  PCI/EP study (including pacer and ICD implementation), Cardiac surgery, MI, abnormal cardiac cath, and chest pain- rule out MI                                    Lab Results: I have reviewed the following results:   Results from last 7 days   Lab Units 03/31/25  0536   WBC Thousand/uL 10.85*   HEMOGLOBIN g/dL 10.8*   HEMATOCRIT % 33.5*   PLATELETS Thousands/uL 200   SEGS PCT % 70   LYMPHO PCT % 18   MONO PCT % 11   EOS PCT % 0     Results from last 7 days   Lab Units 03/31/25  0536   SODIUM mmol/L 139   POTASSIUM mmol/L 2.7*   CHLORIDE mmol/L 103   CO2 mmol/L 27   BUN mg/dL 19   CREATININE mg/dL 0.92   ANION GAP mmol/L 9   CALCIUM mg/dL 8.9   ALBUMIN g/dL 3.6   TOTAL BILIRUBIN mg/dL 0.70   ALK PHOS U/L 75   ALT U/L 18   AST U/L 46*   GLUCOSE RANDOM mg/dL 88     Results from last 7 days   Lab Units 03/30/25  0039   INR  1.07     Results from last 7 days   Lab Units 03/30/25  1904   POC GLUCOSE mg/dl 102     Results from last 7 days   Lab  Units 03/28/25  0939   HEMOGLOBIN A1C % 5.6     Results from last 7 days   Lab Units 03/29/25  2225   LACTIC ACID mmol/L 1.5       Recent Cultures (last 7 days):               Last 24 Hours Medication List:     Current Facility-Administered Medications:     [Transfer Hold] acetaminophen (Ofirmev) injection 1,000 mg, Q6H PRN    [Transfer Hold] aluminum-magnesium hydroxide-simethicone (MAALOX) oral suspension 30 mL, Q6H PRN    [Transfer Hold] aspirin chewable tablet 81 mg, Daily    [Transfer Hold] atorvastatin (LIPITOR) tablet 20 mg, QPM    [Transfer Hold] carvedilol (COREG) tablet 12.5 mg, BID With Meals    [Transfer Hold] Diclofenac Sodium (VOLTAREN) 1 % topical gel 2 g, 4x Daily PRN    [Transfer Hold] Diclofenac Sodium (VOLTAREN) 1 % topical gel 2 g, 4x Daily    [Transfer Hold] fluticasone (FLONASE) 50 mcg/act nasal spray 1 spray, BID    heparin (porcine) 25,000 units in 0.45% NaCl 250 mL infusion (premix), Titrated, Last Rate: Stopped (03/31/25 1030)    [Transfer Hold] heparin (porcine) injection 2,000 Units, Q6H PRN    [Transfer Hold] heparin (porcine) injection 4,000 Units, Q6H PRN    [Transfer Hold] lidocaine (LIDODERM) 5 % patch 1 patch, Daily PRN    [Transfer Hold] lidocaine (LIDODERM) 5 % patch 1 patch, Daily    [Transfer Hold] loratadine (CLARITIN) tablet 10 mg, Daily    [Transfer Hold] losartan (COZAAR) tablet 100 mg, Daily **AND** [DISCONTINUED] hydroCHLOROthiazide tablet 25 mg, Daily    [Transfer Hold] nitroglycerin (NITROSTAT) SL tablet 0.4 mg, Q5 Min PRN    [Transfer Hold] ondansetron (ZOFRAN) injection 4 mg, Q6H PRN    [Transfer Hold] pantoprazole (PROTONIX) EC tablet 40 mg, Daily    potassium chloride (Klor-Con M20) CR tablet 40 mEq, BID    potassium chloride 20 mEq IVPB (premix), Q2H, Last Rate: 20 mEq (03/31/25 1202)    [Transfer Hold] promethazine (PHENERGAN) injection 25 mg, Q6H PRN    Administrative Statements   Today, Patient Was Seen By: Vernon Sánchez MD      **Please Note: This  note may have been constructed using a voice recognition system.**

## 2025-03-31 NOTE — CASE MANAGEMENT
Case Management Assessment & Discharge Planning Note    Patient name Sara Walker  Location /-01 MRN [id number]  : 1965 Date 3/31/2025       Current Admission Date: 3/29/2025  Current Admission Diagnosis:Elevated troponin   Patient Active Problem List    Diagnosis Date Noted Date Diagnosed    Elevated troponin 2025     Abnormal CT of the abdomen 2025     Stage 3a chronic kidney disease (HCC) 10/31/2024     Rheumatoid arthritis (HCC) 10/31/2024     Ambulatory dysfunction 2023     Knee pain 2023     Primary hypertension      Mixed hyperlipidemia      Morbid obesity (HCC)        LOS (days): 1  Geometric Mean LOS (GMLOS) (days):   Days to GMLOS:     OBJECTIVE:    Risk of Unplanned Readmission Score: 10.12         Current admission status: Inpatient       Preferred Pharmacy:   [id number] - [facility] [place] [place] - [address]  [address]  [facility] [place] [address]  Phone: [phone] Fax: [phone]    Primary Care Provider: No primary care provider on file.    Primary Insurance: [employer]  Secondary Insurance:     ASSESSMENT:  Active Health Care Proxies    There are no active Health Care Proxies on file.       Advance Directives  Does patient have a Health Care POA?: No  Was patient offered paperwork?: Yes (declined)  Does patient currently have a Health Care decision maker?: Yes, please see Health Care Proxy section  Does patient have Advance Directives?: No  Was patient offered paperwork?: Yes (declined)  Primary Contact:  Darnell and yusra Dorado         Readmission Root Cause  30 Day Readmission: No    Patient Information  Admitted from:: Home  Mental Status: Alert  During Assessment patient was accompanied by: Other-Comment (yusra Dorado)  Assessment information provided by:: Patient  Primary Caregiver: Self  Support Systems: Spouse/significant other, Family members ( Darnell and yusra Dorado)  County of Residence: [facility]  [facility] do you live in?: [place]  Home entry access options. Select all that apply.: Stairs  Number of steps to enter home.: 5  Do the steps have railings?: Yes  Type of Current Residence: [facility]  Living Arrangements: Lives w/ Spouse/significant other  Is patient a ?: No    Activities of Daily Living Prior to Admission  Functional Status: Independent  Completes ADLs independently?: Yes  Ambulates independently?: Yes  Does patient use assisted devices?: Yes  Assisted Devices (DME) used: Walker, Bedside Commode, Other (Comment) (sit to stand chair)  Does patient currently own DME?: Yes  What DME does the patient currently own?: Bedside Commode, Walker, Other (Comment) (sit to stand chair)  Does patient have a history of Outpatient Therapy (PT/OT)?: No  Does the patient have a history of Short-Term Rehab?: No  Does patient have a history of HHC?: Yes  Does patient currently have HHC?: No         Patient Information Continued  Income Source: Unemployed  Does patient have prescription coverage?: Yes  Can the patient afford their medications and any related supplies (such as glucometers or test strips)?: Yes  Does patient receive dialysis treatments?: No  Does patient have a history of substance abuse?: No  Does patient have a history of Mental Health Diagnosis?: No         Means of Transportation  Means of Transport to Appts:: Family transport          DISCHARGE DETAILS:    Discharge planning discussed with:: patient and yusra Dorado at bedside  Freedom of Choice: Yes  Comments - Freedom of Choice: CM discussed d/c needs and pt is agreeable to [facility] for PT and Nursing.  Referral placed.  Pt will need a Home O2 Eval prior to d/c.  She will also need transport to get home-unable to navigate the steps to get into her home.  Declining STR  CM contacted family/caregiver?: Yes  Were Treatment Team discharge recommendations reviewed with patient/caregiver?: Yes  Did patient/caregiver verbalize understanding of patient care  needs?: Yes  Were patient/caregiver advised of the risks associated with not following Treatment Team discharge recommendations?: Yes    Contacts  Patient Contacts: Ave  Relationship to Patient:: Family  Contact Method: In Person  Reason/Outcome: Continuity of Care, Discharge Planning    Requested Home Health Care         Is the patient interested in HHC at discharge?: Yes  Home Health Discipline requested:: Home Health Aide, Nursing, Physical Therapy  Home Health Follow-Up Provider:: PCP  Home Health Services Needed:: Evaluate Functional Status and Safety, Gait/ADL Training, Strengthening/Theraputic Exercises to Improve Function  Homebound Criteria Met:: Uses an Assist Device (i.e. cane, walker, etc), Requires the Assistance of Another Person for Safe Ambulation or to Leave the Home  Supporting Clincal Findings:: Dyspnea with Exertion, Fatigues Easliy in Short Distances, Limited Endurance    DME Referral Provided  Referral made for DME?: No    Other Referral/Resources/Interventions Provided:  Interventions: HHC  Referral Comments: Declining STR.  HHC pended for aide, PT/OT and nursing    Would you like to participate in our [facility] service program?  : No - Declined    Treatment Team Recommendation: Home with Home Health Care  Discharge Destination Plan:: Home with Home Health Care  Transport at Discharge : Stretcher van

## 2025-03-31 NOTE — UTILIZATION REVIEW
Initial Clinical Review    Start of Care 3/29    Admission: Date/Time/Statement:   Admission Orders (From admission, onward)       Ordered        03/30/25 0201  INPATIENT ADMISSION  Once                          Orders Placed This Encounter   Procedures    INPATIENT ADMISSION     Standing Status:   Standing     Number of Occurrences:   1     Level of Care:   Med Surg [16]     Estimated length of stay:   More than 2 Midnights     Certification:   I certify that inpatient services are medically necessary for this patient for a duration of greater than two midnights. See H&P and MD Progress Notes for additional information about the patient's course of treatment.     ED Arrival Information       Expected   -    Arrival   3/29/2025 21:57    Acuity   Emergent              Means of arrival   Ambulance    Escorted by   [facility]   Hospitalist    Admission type   Emergency              Arrival complaint   Vomiting             Chief Complaint   Patient presents with    Chest Pain     Pt BIBA with a c/o chest pain as well as N/V since approx noon today. Pt reports recently starting Wegovy for weight loss.        Initial Presentation: 59 y.o. female   PMH of hypertension, hyperlipidemia, morbid obesity, RA, CKD who presents with complaint of a sudden onset of midsternal severe chest pain that has been constant this started earlier this evening after vomiting episode. Initial trop 1493 repeat 2659 . Admitted IP status w/ elevated trop plan NPO , cardiology consutl trend trop , asa, statin , coreg , prn ntg , iv morphine pain .     Anticipated Length of Stay/Certification Statement:    Patient will be admitted on an inpatient basis with an anticipated length of stay of greater than 2 midnights secondary to see above.     Date: 3/30    Day 2: CP , elevated trop , cont asa, heparin gtt , statin , BB . Plan for cardiac cath 3/31 . NPO after MN .     3/30 Cardiology Consult   Elevated troponin with chest pain in  a patient with multiple cardiac risk factors. Cardiac catheterization recommended .  Continue aspirin, Lipitor, carvedilol, heparin gtt.     Date: 3/31  Day 3: Has surpassed a 2nd midnight with active treatments and services. no angina but mentions chest wall pain reproducible on the L Cardiac cath pending . Cont asa, statin , BB and monitor . On heparin gtt . K 2.7 being replaced and monitor , tele . Anticipate DC tomorrow to home .       ED Treatment-Medication Administration from 03/29/2025 2157 to 03/30/2025 0222         Date/Time Order Dose Route Action     03/29/2025 2221 ondansetron (ZOFRAN) injection 4 mg 4 mg Intravenous Given     03/29/2025 2359 ondansetron (ZOFRAN) injection 4 mg 4 mg Intravenous Given     03/29/2025 2222 sodium chloride 0.9 % bolus 1,000 mL 1,000 mL Intravenous New Bag     03/29/2025 2222 fentaNYL injection 50 mcg 50 mcg Intravenous Given     03/29/2025 2251 fentaNYL injection 50 mcg 50 mcg Intravenous Given     03/29/2025 2327 iohexol (OMNIPAQUE) 350 MG/ML injection (MULTI-DOSE) 100 mL 100 mL Intravenous Given     03/29/2025 2327 iohexol (OMNIPAQUE) 240 MG/ML solution 50 mL 10 mL Oral Given     03/29/2025 2353 aspirin tablet 325 mg 325 mg Oral Given     03/29/2025 2353 HYDROmorphone (DILAUDID) injection 0.5 mg 0.5 mg Intravenous Given     03/30/2025 0036 heparin (porcine) injection 4,000 Units 4,000 Units Intravenous Given     03/30/2025 0037 heparin (porcine) 25,000 units in 0.45% NaCl 250 mL infusion (premix) 11.1 Units/kg/hr Intravenous New Bag     03/30/2025 0052 metoclopramide (REGLAN) injection 10 mg 10 mg Intravenous Given            Scheduled Medications:  aspirin, 81 mg, Oral, Daily  atorvastatin, 20 mg, Oral, QPM  carvedilol, 12.5 mg, Oral, BID With Meals  Diclofenac Sodium, 2 g, Topical, 4x Daily  fluticasone, 1 spray, Each Nare, BID  lidocaine, 1 patch, Topical, Daily  loratadine, 10 mg, Oral, Daily  losartan, 100 mg, Oral, Daily  pantoprazole, 40 mg, Oral,  Daily      Continuous IV Infusions:  heparin (porcine), 3-20 Units/kg/hr (Order-Specific), Intravenous, Titrated  sodium chloride, 75 mL/hr, Intravenous, Continuous      PRN Meds:  acetaminophen, 1,000 mg, Intravenous, Q6H PRN  aluminum-magnesium hydroxide-simethicone, 30 mL, Oral, Q6H PRN  Diclofenac Sodium, 2 g, Topical, 4x Daily PRN  heparin (porcine), 2,000 Units, Intravenous, Q6H PRN  heparin (porcine), 4,000 Units, Intravenous, Q6H PRN  lidocaine, 1 patch, Topical, Daily PRN  morphine injection, 2 mg, Intravenous, Q4H PRN 3/30 x1  3/31 x2  nitroglycerin, 0.4 mg, Sublingual, Q5 Min PRN  ondansetron, 4 mg, Intravenous, Q6H PRN 3/30 x2  3/31 x3  promethazine, 25 mg, Intravenous, Q6H PRN  3/30 x1  3/31 x1      ED Triage Vitals   Temperature Pulse Respirations Blood Pressure SpO2 Pain Score   03/29/25 2236 03/29/25 2201 03/29/25 2201 03/29/25 2201 03/29/25 2201 03/29/25 2222   98.4 °F (36.9 °C) 79 18 161/79 99 % 10 - Worst Possible Pain     Weight (last 2 days)       Date/Time Weight    03/30/25 0300 148 (325.4)            Vital Signs (last 3 days)       Date/Time Temp Pulse Resp BP MAP (mmHg) SpO2 Calculated FIO2 (%) - Nasal Cannula Nasal Cannula O2 Flow Rate (L/min) O2 Device Patient Position - Orthostatic VS Pain    03/31/25 2300 -- 68 -- 134/57 83 94 % -- -- None (Room air) Sitting --    03/31/25 22:54:30 -- 74 19 134/57 83 92 % -- -- None (Room air) Sitting --    03/31/25 21:31:26 -- 75 19 143/53 83 91 % -- -- None (Room air) Sitting --    03/31/25 1930 -- -- -- -- -- 95 % -- -- None (Room air) -- No Pain    03/31/25 19:22:22 -- 79 18 147/92 110 93 % -- -- None (Room air) -- No Pain    03/31/25 18:44:04 -- 80 16 150/95 113 96 % -- -- None (Room air) -- No Pain    03/31/25 18:21:25 -- 84 18 150/97 115 97 % -- -- None (Room air) -- --    03/31/25 1821 -- -- -- -- -- -- -- -- -- -- No Pain    03/31/25 18:02:16 -- 79 18 154/102 119 97 % -- -- None (Room air) Sitting --    03/31/25 1800 -- -- -- -- -- -- -- -- --  -- No Pain    03/31/25 17:33:31 -- 65 -- 140/82 101 98 % -- -- -- -- --    03/31/25 1732 -- -- -- -- -- -- -- -- -- -- No Pain    03/31/25 17:15:47 -- 82 23 133/83 100 94 % -- -- None (Room air) Sitting No Pain    03/31/25 16:52:02 98.5 °F (36.9 °C) 82 22 127/83 98 93 % -- -- None (Room air) Lying No Pain    03/31/25 16:10:19 -- -- -- -- -- -- -- -- -- -- No Pain    03/31/25 16:10:02 -- -- -- -- -- 98 % 28 2 L/min Nasal cannula -- --    03/31/25 1051 97 °F (36.1 °C) 72 20 129/69 -- 98 % -- -- None (Room air) -- 2    03/31/25 1036 -- -- -- -- -- -- -- -- -- -- 2    03/31/25 10:07:11 -- 70 18 156/96 116 95 % -- -- None (Room air) -- --    03/31/25 1006 -- -- -- -- -- -- -- -- -- -- 8    03/31/25 07:48:04 -- 78 18 -- -- 93 % -- -- None (Room air) -- --    03/31/25 07:48:03 98.1 °F (36.7 °C) -- -- 164/101 122 -- -- -- -- -- --    03/31/25 02:43:31 98.3 °F (36.8 °C) 70 20 116/67 83 96 % -- -- -- -- --    03/31/25 0149 -- -- -- -- -- -- -- -- -- -- 8    03/30/25 22:55:02 98.5 °F (36.9 °C) 69 -- 151/104 120 96 % -- -- -- -- --    03/30/25 2016 -- -- -- -- -- -- -- -- -- -- 6    03/30/25 1930 -- -- -- -- -- -- -- -- -- -- 6 03/30/25 1759 -- -- -- -- -- -- -- -- -- -- 6 03/30/25 15:52:21 97.9 °F (36.6 °C) 77 -- 150/106 121 95 % -- -- -- -- --    03/30/25 1200 -- 83 -- -- -- 96 % -- -- -- -- --    03/30/25 1100 -- 85 -- -- -- 96 % -- -- -- -- --    03/30/25 1000 -- 78 -- -- -- 95 % -- -- -- -- --    03/30/25 0900 -- 92 -- -- -- 95 % -- -- -- -- --    03/30/25 08:17:03 98.9 °F (37.2 °C) 94 17 134/84 101 90 % -- -- -- -- --    03/30/25 0817 98.9 °F (37.2 °C) -- -- -- -- 90 % -- -- None (Room air) -- No Pain    03/30/25 0800 -- 90 -- -- -- 93 % -- -- -- -- --    03/30/25 0300 98.5 °F (36.9 °C) -- 19 -- -- -- -- -- -- -- --    03/30/25 0259 -- -- -- -- -- -- -- -- -- -- 2    03/30/25 02:43:54 -- 110 -- 118/79 92 90 % -- -- -- -- --    03/30/25 0118 -- -- -- -- -- 93 % 32 3 L/min Nasal cannula -- --    03/30/25 0115 -- 93 20  168/76 -- 87 %  -- -- None (Room air) Lying --    03/29/25 2353 -- -- -- -- -- -- -- -- -- -- 10 - Worst Possible Pain    03/29/25 2251 -- -- -- -- -- -- -- -- -- -- 10 - Worst Possible Pain    03/29/25 2236 98.4 °F (36.9 °C) -- -- -- -- -- -- -- -- -- --    03/29/25 2222 -- -- -- -- -- -- -- -- -- -- 10 - Worst Possible Pain    03/29/25 2203 -- -- -- -- -- -- -- -- None (Room air) -- --    03/29/25 2201 -- 79 18 161/79 -- 99 % -- -- None (Room air) Lying --              Pertinent Labs/Diagnostic Test Results:   Radiology:  RADIOLOGY RESULTS   Final Interpretation by  (03/31 0833)      CT chest abdomen pelvis w contrast   Final Interpretation by Cj Adkins MD (03/30 0903)   1. No acute findings.   2. Right adrenal nodule with calcifications suggesting prior hemorrhage. Correlate with prior studies if available.   3. Renal cysts, one suspected hemorrhagic. Recommend follow-up ultrasound.      The study was marked in EPIC for significant notification.      Virtual radiologic report was provided at 0142 hours on 3/30/2025.                     Workstation performed: [id number]           Cardiology:  Cardiac catheterization   Final Result by Grant Rosales MD (03/31 1638)   Minimal nonobstructive coronary atherosclerosis         ECG 12 lead   Final Result by Anil Barragan MD (03/31 0838)   Sinus tachycardia   Nonspecific ST abnormality   When compared with ECG of 30-Mar-2025 02:44, (unconfirmed)   No significant change was found   Confirmed by Anil Barragan ([id number]) on 3/31/2025 8:38:40 AM      ECG 12 lead   Final Result by Anil Barragan MD (03/30 1108)   Sinus tachycardia   Confirmed by Anil Barragan ([id number]) on 3/30/2025 11:08:44 AM      ECG 12 lead   Final Result by Anil Barragan MD (03/30 1108)   Normal sinus rhythm   Rightward axis   Borderline ECG   Confirmed by Anil Barragan ([id number]) on 3/30/2025 11:08:18 AM      ECG 12 lead   Final Result by Anil Barragan MD (03/30 1107)   Normal sinus rhythm    Rightward axis   Incomplete right bundle branch block   Nonspecific ST abnormality   Abnormal ECG   No previous ECGs available   Confirmed by Anil Barragan [id number]) on 3/30/2025 11:07:11 AM        GI:  No orders to display           Results from last 7 days   Lab Units 03/31/25  0536 03/30/25 0039 03/29/25 2222   WBC Thousand/uL 10.85* 12.60* 13.20*   HEMOGLOBIN g/dL 10.8* 12.1 12.5   HEMATOCRIT % 33.5* 37.5 38.7   PLATELETS Thousands/uL 200 239 264   TOTAL NEUT ABS Thousands/µL 7.60  --  12.02*         Results from last 7 days   Lab Units 03/31/25  1629 03/31/25 0536 03/29/25 2222 03/28/25  0939   SODIUM mmol/L 139 139 138 144   POTASSIUM mmol/L 3.2* 2.7* 3.7 3.7   CHLORIDE mmol/L 104 103 100 102   CO2 mmol/L 25 27 27 29   ANION GAP mmol/L 10 9 11 13*   BUN mg/dL 19 19 25 28*   CREATININE mg/dL 0.89 0.92 0.94 1.15*   EGFR ml/min/1.73sq m 72 69 67 55*   CALCIUM mg/dL 8.8 8.9 10.1 9.3   MAGNESIUM mg/dL  --  1.9  --   --    PHOSPHORUS mg/dL  --  2.8  --   --      Results from last 7 days   Lab Units 03/31/25  0536 03/29/25 2222 03/28/25  0939   AST U/L 46* 29 17   ALT U/L 18 15 12   ALK PHOS U/L 75 97 87   TOTAL PROTEIN g/dL 6.7 8.4 7   ALBUMIN g/dL 3.6 4.3 4   TOTAL BILIRUBIN mg/dL 0.70 0.50 0.4     Results from last 7 days   Lab Units 03/30/25  1904   POC GLUCOSE mg/dl 102     Results from last 7 days   Lab Units 03/31/25  1629 03/31/25  0536 03/29/25 2222 03/28/25  0939   GLUCOSE RANDOM mg/dL 84 88 159* 87       Results from last 7 days   Lab Units 03/28/25  0939   HEMOGLOBIN A1C % 5.6   EAG mg/dL 114         Results from last 7 days   Lab Units 03/30/25  0920 03/30/25  0649 03/30/25  0452 03/30/25  0230 03/30/25  0039 03/29/25  2222   HS TNI 0HR ng/L  --   --  5,435*  --   --  1,493*   HS TNI 2HR ng/L  --  5,457*  --   --  2,659*  --    HSTNI D2 ng/L  --  22*  --   --  1,166*  --    HS TNI 4HR ng/L 4,344*  --   --  4,206*  --   --    HSTNI D4 ng/L -1,091  --   --  2,713*  --   --          Results from  last 7 days   Lab Units 03/31/25  0915 03/31/25  0152 03/30/25  1952 03/30/25  0649 03/30/25  0039   PROTIME seconds  --   --   --   --  14.6   INR   --   --   --   --  1.07   PTT seconds 96* 56* 60*   < > 30    < > = values in this interval not displayed.     Results from last 7 days   Lab Units 03/29/25  2225   LACTIC ACID mmol/L 1.5       Results from last 7 days   Lab Units 03/29/25  2222   LIPASE u/L <6*           Past Medical History:   Diagnosis Date    Hyperlipidemia     Hypertension     Morbid obesity (HCC)     Rheumatoid arthritis (HCC)      Present on Admission:   Primary hypertension   Mixed hyperlipidemia      Admitting Diagnosis: Vomiting [R11.10]  Chest pain [R07.9]  Nausea & vomiting [R11.2]  NSTEMI (non-ST elevated myocardial infarction) (HCC) [I21.4]  Age/Sex: 59 y.o. female    Network Utilization Review Department  ATTENTION: Please call with any questions or concerns to [phone] and carefully listen to the prompts so that you are directed to the right person. All voicemails are confidential.   For Discharge needs, contact Care Management DC Support Team at [phone] opt. [phone]  Send all requests for admission clinical reviews, approved or denied determinations and any other requests to dedicated fax number below belonging to the campus where the patient is receiving treatment. List of dedicated fax numbers for the Facilities:  FACILITY NAME UR FAX NUMBER   ADMISSION DENIALS (Administrative/Medical Necessity) [phone]   DISCHARGE SUPPORT TEAM (NETWORK) [phone]   PARENT CHILD HEALTH (Maternity/NICU/Pediatrics) [phone]   [facility] [phone]   [facility] [phone]   [facility] [phone]   [facility] [phone]   [facility] [phone]   [facility] [phone]   [facility] [phone]    [facility] [phone]   [facility] [phone]   [facility] [phone]   [facility] [phone]   [facility] [phone]

## 2025-03-31 NOTE — ASSESSMENT & PLAN NOTE
Presenting with episode of vomiting and then chest pain following, currently chest pain has subsided with pain medication in the ED  EKG x 2 revealing normal sinus rhythm  CT chest abdomen pelvis negative for acute dissection or aneurysm  Initial troponin noted at 1493, repeat 2659, will trend until peak  Telemetry    Pending cardiac cath today PM  Continue aspirin, statin, beta-blocker  Continue to monitor  Cardiology following  Heparin gtt

## 2025-03-31 NOTE — ASSESSMENT & PLAN NOTE
Incidental finding on CT chest abdomen pelvis of 39 mm exophytic left upper pole renal cyst and 28 mm right adrenal nodule, no prior studies to compare  Can follow-up outpatient for repeat imaging  OP follow up

## 2025-04-01 ENCOUNTER — APPOINTMENT (INPATIENT)
Dept: NON INVASIVE DIAGNOSTICS | Facility: HOSPITAL | Age: 60
DRG: 287 | End: 2025-04-01
Payer: COMMERCIAL

## 2025-04-01 VITALS
BODY MASS INDEX: 57.52 KG/M2 | HEART RATE: 71 BPM | RESPIRATION RATE: 19 BRPM | SYSTOLIC BLOOD PRESSURE: 127 MMHG | TEMPERATURE: 98.4 F | DIASTOLIC BLOOD PRESSURE: 62 MMHG | WEIGHT: 293 LBS | HEIGHT: 60 IN | OXYGEN SATURATION: 94 %

## 2025-04-01 PROBLEM — I25.10 NONOBSTRUCTIVE ATHEROSCLEROSIS OF CORONARY ARTERY: Status: ACTIVE | Noted: 2025-04-01

## 2025-04-01 LAB
ALBUMIN SERPL BCG-MCNC: 3.3 G/DL (ref 3.5–5)
ALP SERPL-CCNC: 68 U/L (ref 34–104)
ALT SERPL W P-5'-P-CCNC: 23 U/L (ref 7–52)
ANION GAP SERPL CALCULATED.3IONS-SCNC: 8 MMOL/L (ref 4–13)
AORTIC ROOT: 2.9 CM
ASCENDING AORTA: 3.2 CM
AST SERPL W P-5'-P-CCNC: 33 U/L (ref 13–39)
BASOPHILS # BLD AUTO: 0.04 THOUSANDS/ÂΜL (ref 0–0.1)
BASOPHILS NFR BLD AUTO: 1 % (ref 0–1)
BILIRUB SERPL-MCNC: 0.46 MG/DL (ref 0.2–1)
BSA FOR ECHO PROCEDURE: 2.29 M2
BUN SERPL-MCNC: 24 MG/DL (ref 5–25)
CALCIUM ALBUM COR SERPL-MCNC: 8.9 MG/DL (ref 8.3–10.1)
CALCIUM SERPL-MCNC: 8.3 MG/DL (ref 8.4–10.2)
CHLORIDE SERPL-SCNC: 106 MMOL/L (ref 96–108)
CO2 SERPL-SCNC: 26 MMOL/L (ref 21–32)
CREAT SERPL-MCNC: 1.05 MG/DL (ref 0.6–1.3)
EOSINOPHIL # BLD AUTO: 0.04 THOUSAND/ÂΜL (ref 0–0.61)
EOSINOPHIL NFR BLD AUTO: 1 % (ref 0–6)
ERYTHROCYTE [DISTWIDTH] IN BLOOD BY AUTOMATED COUNT: 14.9 % (ref 11.6–15.1)
FRACTIONAL SHORTENING: 38 (ref 28–44)
GFR SERPL CREATININE-BSD FRML MDRD: 59 ML/MIN/1.73SQ M
GLUCOSE SERPL-MCNC: 78 MG/DL (ref 65–140)
HCT VFR BLD AUTO: 32.1 % (ref 34.8–46.1)
HGB BLD-MCNC: 10.3 G/DL (ref 11.5–15.4)
IMM GRANULOCYTES # BLD AUTO: 0.04 THOUSAND/UL (ref 0–0.2)
IMM GRANULOCYTES NFR BLD AUTO: 1 % (ref 0–2)
INTERVENTRICULAR SEPTUM IN DIASTOLE (PARASTERNAL SHORT AXIS VIEW): 1 CM
INTERVENTRICULAR SEPTUM: 1 CM (ref 0.6–1.1)
LEFT ATRIUM SIZE: 4.4 CM
LEFT INTERNAL DIMENSION IN SYSTOLE: 3.4 CM (ref 2.1–4)
LEFT VENTRICULAR INTERNAL DIMENSION IN DIASTOLE: 5.5 CM (ref 3.5–6)
LEFT VENTRICULAR POSTERIOR WALL IN END DIASTOLE: 1.1 CM
LEFT VENTRICULAR STROKE VOLUME: 101 ML
LVSV (TEICH): 101 ML
LYMPHOCYTES # BLD AUTO: 2.32 THOUSANDS/ÂΜL (ref 0.6–4.47)
LYMPHOCYTES NFR BLD AUTO: 26 % (ref 14–44)
MCH RBC QN AUTO: 28.4 PG (ref 26.8–34.3)
MCHC RBC AUTO-ENTMCNC: 32.1 G/DL (ref 31.4–37.4)
MCV RBC AUTO: 88 FL (ref 82–98)
MONOCYTES # BLD AUTO: 1.12 THOUSAND/ÂΜL (ref 0.17–1.22)
MONOCYTES NFR BLD AUTO: 13 % (ref 4–12)
NEUTROPHILS # BLD AUTO: 5.26 THOUSANDS/ÂΜL (ref 1.85–7.62)
NEUTS SEG NFR BLD AUTO: 58 % (ref 43–75)
NRBC BLD AUTO-RTO: 0 /100 WBCS
PLATELET # BLD AUTO: 188 THOUSANDS/UL (ref 149–390)
PMV BLD AUTO: 9.2 FL (ref 8.9–12.7)
POTASSIUM SERPL-SCNC: 3.4 MMOL/L (ref 3.5–5.3)
PROT SERPL-MCNC: 6.6 G/DL (ref 6.4–8.4)
RA PRESSURE ESTIMATED: 15 MMHG
RBC # BLD AUTO: 3.63 MILLION/UL (ref 3.81–5.12)
SL CV LV EF: 60
SL CV PED ECHO LEFT VENTRICLE DIASTOLIC VOLUME (MOD BIPLANE) 2D: 149 ML
SL CV PED ECHO LEFT VENTRICLE SYSTOLIC VOLUME (MOD BIPLANE) 2D: 49 ML
SODIUM SERPL-SCNC: 140 MMOL/L (ref 135–147)
WBC # BLD AUTO: 8.82 THOUSAND/UL (ref 4.31–10.16)

## 2025-04-01 PROCEDURE — 99232 SBSQ HOSP IP/OBS MODERATE 35: CPT | Performed by: INTERNAL MEDICINE

## 2025-04-01 PROCEDURE — 85025 COMPLETE CBC W/AUTO DIFF WBC: CPT | Performed by: INTERNAL MEDICINE

## 2025-04-01 PROCEDURE — 93306 TTE W/DOPPLER COMPLETE: CPT | Performed by: INTERNAL MEDICINE

## 2025-04-01 PROCEDURE — 80053 COMPREHEN METABOLIC PANEL: CPT | Performed by: INTERNAL MEDICINE

## 2025-04-01 PROCEDURE — 99239 HOSP IP/OBS DSCHRG MGMT >30: CPT | Performed by: HOSPITALIST

## 2025-04-01 PROCEDURE — 93306 TTE W/DOPPLER COMPLETE: CPT

## 2025-04-01 RX ORDER — LOPERAMIDE HYDROCHLORIDE 2 MG/1
2 CAPSULE ORAL ONCE
Status: COMPLETED | OUTPATIENT
Start: 2025-04-01 | End: 2025-04-01

## 2025-04-01 RX ADMIN — LOPERAMIDE HYDROCHLORIDE 2 MG: 2 CAPSULE ORAL at 11:21

## 2025-04-01 RX ADMIN — PANTOPRAZOLE SODIUM 40 MG: 40 TABLET, DELAYED RELEASE ORAL at 09:58

## 2025-04-01 RX ADMIN — HYDROCODONE BITARTRATE AND ACETAMINOPHEN 2 TABLET: 5; 325 TABLET ORAL at 14:04

## 2025-04-01 RX ADMIN — ASPIRIN 81 MG: 81 TABLET, CHEWABLE ORAL at 09:58

## 2025-04-01 RX ADMIN — FLUTICASONE PROPIONATE 1 SPRAY: 50 SPRAY, METERED NASAL at 10:04

## 2025-04-01 RX ADMIN — Medication 2 G: at 10:04

## 2025-04-01 RX ADMIN — LIDOCAINE 5% 1 PATCH: 700 PATCH TOPICAL at 09:58

## 2025-04-01 RX ADMIN — CARVEDILOL 12.5 MG: 12.5 TABLET, FILM COATED ORAL at 10:02

## 2025-04-01 RX ADMIN — LOSARTAN POTASSIUM 100 MG: 50 TABLET, FILM COATED ORAL at 09:57

## 2025-04-01 RX ADMIN — LORATADINE 10 MG: 10 TABLET ORAL at 09:57

## 2025-04-01 NOTE — DISCHARGE SUMMARY
Discharge Summary - Hospitalist   Name: Sara Walker 59 y.o. female I MRN: [id number]  Unit/Bed#: -01 I Date of Admission: 3/29/2025   Date of Service: 4/1/2025 I Hospital Day: 2     Assessment & Plan  Elevated troponin  Presenting with episode of vomiting and then chest pain following, currently chest pain has subsided with pain medication in the ED  EKG x 2 revealing normal sinus rhythm  CT chest abdomen pelvis negative for acute dissection or aneurysm  Initial troponin noted at 1493, repeat 2659, will trend until peak  Telemetry     cardiac cath - no obstructive CAD  Continue aspirin, statin, beta-blocker  Continue to monitor  Cardiology following  Echo -ef  ok   Hypokalemia  Resolving with repletions  Likely 2/2 n/v  Telemetry monitor  Abnormal CT of the abdomen  Incidental finding on CT chest abdomen pelvis of 39 mm exophytic left upper pole renal cyst and 28 mm right adrenal nodule, no prior studies to compare  Can follow-up outpatient for repeat imaging, pt aware of need to  OP follow up  Primary hypertension  Continue carvedilol and losartan  Mixed hyperlipidemia  Continue statin    Hospital Course:     Sara Walker is a 59 y.o. female patient who originally presented to the hospital on   Admission Orders (From admission, onward)       Ordered        03/30/25 0201  INPATIENT ADMISSION  Once                         due to chest pain. Due to risk factors, pt underwent cardiac cath that did not show obstructive CAD. Pt cleared for discharge by cards and symptomatically improved.     Please see above list of diagnoses and related plan for additional information.     Physical Exam:    GEN: No acute distress, comfortable  HEEENT: No JVD, PERRLA, no scleral icterus  RESP: Lungs clear to auscultation bilaterally  CV: RRR, +s1/s2   ABD: SOFT NON TENDER, POSITIVE BOWEL SOUNDS, NO DISTENTION  PSYCH: CALM  NEURO: [place] baseline, NO FOCAL DEFICITS  SKIN: NO RASH  EXTREM: NO EDEMA    CONSULTING  PROVIDERS   IP CONSULT TO CARDIOLOGY    PROCEDURES PERFORMED  Procedure(s) (LRB):  Cardiac Catheterization (N/A)  Cardiac Left Heart Cath (Left)  Cardiac Coronary Angiogram (N/A)    RADIOLOGY RESULTS  Echo complete w/ contrast if indicated  Result Date: 4/1/2025  Narrative:   Technically difficult study.   Left Ventricle: Left ventricular cavity size is normal. Wall thickness is normal. The left ventricular ejection fraction is 60%. Systolic function is normal. Wall motion cannot be accurately assessed. Diastolic function is normal.   Right Ventricle: Right ventricular cavity size is normal.   Mitral Valve: There is trace regurgitation.   Tricuspid Valve: There is trace regurgitation.   IVC/SVC: The right atrial pressure is estimated at 15.0 mmHg. The inferior vena cava is dilated. Respirophasic changes were blunted (less than 50% variation).     Cardiac catheterization  Result Date: 3/31/2025  Narrative: Minimal nonobstructive coronary atherosclerosis     CT chest abdomen pelvis w contrast  Result Date: 3/30/2025  Narrative: CT CHEST, ABDOMEN AND PELVIS WITH IV CONTRAST INDICATION: vomiting starting at noon now w/ chest pain, please give small amount of PO contrast prior to imaging to help evaluate for esophageal injury. COMPARISON: None. TECHNIQUE: CT examination of the chest, abdomen and pelvis was performed. Multiplanar 2D reformatted images were created from the source data. This examination, like all CT scans performed in the [facility], was performed utilizing techniques to minimize radiation dose exposure, including the use of iterative reconstruction and automated exposure control. Radiation dose length product (DLP) for this visit: 1834 mGy-cm IV Contrast: 100 mL of iohexol 10 mL of iohexol Enteric Contrast: Not administered. FINDINGS: CHEST LUNGS: Lungs are clear. No tracheal or endobronchial lesion. PLEURA: Unremarkable. HEART/GREAT VESSELS: Heart is unremarkable for patient's age. No  thoracic aortic aneurysm. MEDIASTINUM AND ZEENAT: Unremarkable. CHEST WALL AND LOWER NECK: Unremarkable. ABDOMEN LIVER/BILIARY TREE: Enlarged fatty liver. GALLBLADDER: No calcified gallstones. No pericholecystic inflammatory change. SPLEEN: Unremarkable. PANCREAS: Unremarkable. ADRENAL GLANDS: Right adrenal nodule 2.8 cm with some calcifications suggestive of prior hemorrhage. KIDNEYS/URETERS: Probable renal cysts some mildly complex including a right lower pole nodule with slightly increased attenuation. Follow up ultrasound. STOMACH AND BOWEL: Unremarkable. APPENDIX: No findings to suggest appendicitis. ABDOMINOPELVIC CAVITY: No ascites. No pneumoperitoneum. No lymphadenopathy. VESSELS: Unremarkable for patient's age. PELVIS REPRODUCTIVE ORGANS: Unremarkable for patient's age. URINARY BLADDER: Unremarkable. ABDOMINAL WALL/INGUINAL REGIONS: Unremarkable. BONES: No acute fracture or suspicious osseous lesion.     Impression: 1. No acute findings. 2. Right adrenal nodule with calcifications suggesting prior hemorrhage. Correlate with prior studies if available. 3. Renal cysts, one suspected hemorrhagic. Recommend follow-up ultrasound. The study was marked in EPIC for significant notification. Virtual radiologic report was provided at 0142 hours on 3/30/2025. Workstation performed: [id number]       LABS  Results from last 7 days   Lab Units 04/01/25  0521 03/31/25  0536 03/30/25  0039 03/29/25  2222   WBC Thousand/uL 8.82 10.85* 12.60* 13.20*   HEMOGLOBIN g/dL 10.3* 10.8* 12.1 12.5   HEMATOCRIT % 32.1* 33.5* 37.5 38.7   MCV fL 88 89 88 87   PLATELETS Thousands/uL 188 200 239 264   INR   --   --  1.07  --      Results from last 7 days   Lab Units 04/01/25  0521 03/31/25  1629 03/31/25  0536 03/29/25  2222 03/28/25  0939   SODIUM mmol/L 140 139 139 138 144   POTASSIUM mmol/L 3.4* 3.2* 2.7* 3.7 3.7   CHLORIDE mmol/L 106 104 103 100 102   CO2 mmol/L 26 25 27 27 29   BUN mg/dL 24 19 19 25 28*   CREATININE mg/dL 1.05 0.89  "0.92 0.94 1.15*   CALCIUM mg/dL 8.3* 8.8 8.9 10.1 9.3   ALBUMIN g/dL 3.3*  --  3.6 4.3 4   TOTAL BILIRUBIN mg/dL 0.46  --  0.70 0.50 0.4   ALK PHOS U/L 68  --  75 97 87   ALT U/L 23  --  18 15 12   AST U/L 33  --  46* 29 17   EGFR ml/min/1.73sq m 59 72 69 67 55*   GLUCOSE RANDOM mg/dL 78 84 88 159* 87     Results from last 7 days   Lab Units 03/30/25  0920 03/30/25  0649 03/30/25  0452   HS TNI 0HR ng/L  --   --  5,435*   HS TNI 2HR ng/L  --  5,457*  --    HS TNI 4HR ng/L 4,344*  --   --               Results from last 7 days   Lab Units 03/30/25  1904   POC GLUCOSE mg/dl 102     Results from last 7 days   Lab Units 03/28/25  0939   HEMOGLOBIN A1C % 5.6         Results from last 7 days   Lab Units 03/29/25  2225   LACTIC ACID mmol/L 1.5           Cultures:         Invalid input(s): \"URIBILINOGEN\"                Condition at Discharge:  good      Discharge instructions/Information to patient and family:   See after visit summary for information provided to patient and family.  The above hospital course, results, incidental findings, need for follow up was discussed in detail with the patient and/or family.    Provisions for Follow-Up Care:  See after visit summary for information related to follow-up care and any pertinent home health orders.      Disposition:     Home       Discharge Statement:  I spent 37 minutes discharging the patient. This time was spent on the day of discharge. I had direct contact with the patient on the day of discharge. Greater than 50% of the total time was spent examining patient, answering all patient questions, arranging and discussing plan of care with patient as well as directly providing post-discharge instructions.  Additional time then spent on discharge activities. D/w dtr    Discharge Medications:  See after visit summary for reconciled discharge medications provided to patient and family.      ** Please Note: This note has been constructed using a voice recognition system **  "

## 2025-04-01 NOTE — ASSESSMENT & PLAN NOTE
Incidental finding on CT chest abdomen pelvis of 39 mm exophytic left upper pole renal cyst and 28 mm right adrenal nodule, no prior studies to compare  Can follow-up outpatient for repeat imaging, pt aware of need to  OP follow up

## 2025-04-01 NOTE — PLAN OF CARE
Problem: PAIN - ADULT  Goal: Verbalizes/displays adequate comfort level or baseline comfort level  Description: Interventions:- Encourage patient to monitor pain and request assistance- Assess pain using appropriate pain scale- Administer analgesics based on type and severity of pain and evaluate response- Implement non-pharmacological measures as appropriate and evaluate response- Consider cultural and social influences on pain and pain management- Notify physician/advanced practitioner if interventions unsuccessful or patient reports new pain  Outcome: Progressing     Problem: INFECTION - ADULT  Goal: Absence or prevention of progression during hospitalization  Description: INTERVENTIONS:- Assess and monitor for signs and symptoms of infection- Monitor lab/diagnostic results- Monitor all insertion sites, i.e. indwelling lines, tubes, and drains- Monitor endotracheal if appropriate and nasal secretions for changes in amount and color- [place] appropriate cooling/warming therapies per order- Administer medications as ordered- Instruct and encourage patient and family to use good hand hygiene technique- Identify and instruct in appropriate isolation precautions for identified infection/condition  Outcome: Progressing  Goal: Absence of fever/infection during neutropenic period  Description: INTERVENTIONS:- Monitor WBC  Outcome: Progressing     Goal: Maintain or return to baseline ADL function  Description: INTERVENTIONS:-  Assess patient's ability to carry out ADLs; assess patient's baseline for ADL function and identify physical deficits which impact ability to perform ADLs (bathing, care of mouth/teeth, toileting, grooming, dressing, etc.)- Assess/evaluate cause of self-care deficits - Assess range of motion- Assess patient's mobility; develop plan if impaired- Assess patient's need for assistive devices and provide as appropriate- Encourage maximum independence but intervene and supervise when necessary-  Involve family in performance of ADLs- Assess for home care needs following discharge - Consider OT consult to assist with ADL evaluation and planning for discharge- Provide patient education as appropriate  Outcome: Progressing     Problem: DISCHARGE PLANNING  Goal: Discharge to home or other facility with appropriate resources  Description: INTERVENTIONS:- Identify barriers to discharge w/patient and caregiver- Arrange for needed discharge resources and transportation as appropriate- Identify discharge learning needs (meds, wound care, etc.)- Arrange for interpretive services to assist at discharge as needed- Refer to Case Management Department for coordinating discharge planning if the patient needs post-hospital services based on physician/advanced practitioner order or complex needs related to functional status, cognitive ability, or social support system  Outcome: Progressing     Problem: Knowledge Deficit  Goal: Patient/family/caregiver demonstrates understanding of disease process, treatment plan, medications, and discharge instructions  Description: Complete learning assessment and assess knowledge base.Interventions:- Provide teaching at level of understanding- Provide teaching via preferred learning methods  Outcome: Progressing     Problem: Prexisting or High Potential for Compromised Skin Integrity  Goal: Skin integrity is maintained or improved  Description: INTERVENTIONS:- Identify patients at risk for skin breakdown- Assess and monitor skin integrity- Assess and monitor nutrition and hydration status- Monitor labs - Assess for incontinence - Turn and reposition patient- Assist with mobility/ambulation- Relieve pressure over bony prominences- Avoid friction and shearing- Provide appropriate hygiene as needed including keeping skin clean and dry- Evaluate need for skin moisturizer/barrier cream- Collaborate with interdisciplinary team - Patient/family teaching- Consider wound care consult   Outcome:  Progressing     Problem: CARDIOVASCULAR - ADULT  Goal: Maintains optimal cardiac output and hemodynamic stability  Description: INTERVENTIONS:- Monitor I/O, vital signs and rhythm- Monitor for S/S and trends of decreased cardiac output- Administer and titrate ordered vasoactive medications to optimize hemodynamic stability- Assess quality of pulses, skin color and temperature- Assess for signs of decreased coronary artery perfusion- Instruct patient to report change in severity of symptoms  Outcome: Progressing  Goal: Absence of cardiac dysrhythmias or at baseline rhythm  Description: INTERVENTIONS:- Continuous cardiac monitoring, vital signs, obtain 12 lead EKG if ordered- Administer antiarrhythmic and heart rate control medications as ordered- Monitor electrolytes and administer replacement therapy as ordered  Outcome: Progressing

## 2025-04-01 NOTE — CASE MANAGEMENT
Case Management Assessment & Discharge Planning Note    Patient name Sara Walker  Location /-01 MRN [id number]  : 1965 Date 2025       Current Admission Date: 3/29/2025  Current Admission Diagnosis:Elevated troponin   Patient Active Problem List    Diagnosis Date Noted Date Diagnosed    Nonobstructive atherosclerosis of coronary artery 2025     Hypokalemia 2025     Elevated troponin 2025     Abnormal CT of the abdomen 2025     Stage 3a chronic kidney disease (HCC) 10/31/2024     Rheumatoid arthritis (HCC) 10/31/2024     Ambulatory dysfunction 2023     Knee pain 2023     Primary hypertension      Mixed hyperlipidemia      Morbid obesity (HCC)        LOS (days): 2  Geometric Mean LOS (GMLOS) (days): 2.1  Days to GMLOS:-0.5     OBJECTIVE:    Risk of Unplanned Readmission Score: 12.47         Current admission status: Inpatient       Preferred Pharmacy:   [id number] - [facility] [place] - [address]  [address]  [facility] [place] [address]  Phone: [phone] Fax: [phone]    Primary Care Provider: No primary care provider on file.    Primary Insurance: [employer]  Secondary Insurance:     ASSESSMENT:  Active Health Care Proxies    There are no active Health Care Proxies on file.         DISCHARGE DETAILS:    Discharge planning discussed with:: Patient  Freedom of Choice: Yes  Comments - Freedom of Choice: Patient is cleared for d/c today.  Home O2 not needed.   CM gave the patient the Accepting Provider List for HHC- she declined stating she has reconsidered and does not want HHC.  The patient states she has no other identified needs for CM other than a ride home.  CM contacted family/caregiver?: Yes (In room)  Were Treatment Team discharge recommendations reviewed with patient/caregiver?: Yes  Did patient/caregiver verbalize understanding of patient care needs?: Yes  Were patient/caregiver advised of the risks associated with not  following Treatment Team discharge recommendations?: Yes    Contacts  Patient Contacts: Ave  Relationship to Patient:: Family  Contact Method: In Person  Reason/Outcome: Continuity of Care, Discharge Planning    Requested Home Health Care         Is the patient interested in HHC at discharge?: No (recommended and declined)    DME Referral Provided  Referral made for DME?: No    Other Referral/Resources/Interventions Provided:  Referral Comments: Patient declined STR and HHC- Accepting Provider list was given for review ,but she states it is not needed.         Treatment Team Recommendation: Home with Home Health Care     Transport at Discharge : Stretcher van        Transported by (Company and Unit #): [employer] [phone] Awarded 3:30pm EDT  ETA of Transport (Date): 04/01/25  ETA of Transport (Time): 1530              IMM Given (Date):: 04/01/25 (IMM discussed with the patient- who verbalized understanding and has no intent to appeal.  She signed the original and weas given a copy.  original placed in MR.)  IMM Given to:: Patient

## 2025-04-01 NOTE — PLAN OF CARE
Problem: INFECTION - ADULT  Goal: Absence or prevention of progression during hospitalization  Description: INTERVENTIONS:- Assess and monitor for signs and symptoms of infection- Monitor lab/diagnostic results- Monitor all insertion sites, i.e. indwelling lines, tubes, and drains- Monitor endotracheal if appropriate and nasal secretions for changes in amount and color- [place] appropriate cooling/warming therapies per order- Administer medications as ordered- Instruct and encourage patient and family to use good hand hygiene technique- Identify and instruct in appropriate isolation precautions for identified infection/condition  Outcome: Progressing

## 2025-04-01 NOTE — PLAN OF CARE
Patient given discharge information, hep lock removed. Patient taken via transportation to home, belongings with patient.

## 2025-04-01 NOTE — ASSESSMENT & PLAN NOTE
Presenting with episode of vomiting and then chest pain following, currently chest pain has subsided with pain medication in the ED  EKG x 2 revealing normal sinus rhythm  CT chest abdomen pelvis negative for acute dissection or aneurysm  Initial troponin noted at 1493, repeat 2659, will trend until peak  Telemetry     cardiac cath - no obstructive CAD  Continue aspirin, statin, beta-blocker  Continue to monitor  Cardiology following  Echo -ef  ok

## 2025-04-01 NOTE — ASSESSMENT & PLAN NOTE
Peak troponin 5457.  Echocardiogram demonstrating EF 60%  Cardiac catheterization demonstrated minimal nonobstructive CAD.  Suspect elevated troponins are nonischemic myocardial injury secondary to emesis and hypokalemia.

## 2025-04-01 NOTE — PROGRESS NOTES
Cardiology Progress Note - Sara Walker 59 y.o. female MRN: [id number]    Unit/Bed#: -01 Encounter: [id number]      Assessment/Plan:   Assessment & Plan  Elevated troponin  Peak troponin 5457.  Echocardiogram demonstrating EF 60%  Cardiac catheterization demonstrated minimal nonobstructive CAD.  Suspect elevated troponins are nonischemic myocardial injury secondary to emesis and hypokalemia.    Primary hypertension  Continue Coreg and losartan.    Mixed hyperlipidemia  Continue Lipitor.    Hypokalemia  Management per primary team.    Nonobstructive atherosclerosis of coronary artery  Continue aspirin, Lipitor, Coreg      Cardiology will sign-off. Please reach out with any further questions or concerns.  Recommend close follow-up with her PCP.      Subjective:   Patient seen and examined.  She offers no specific cardiac concerns or complaints today.    Cardiac catheterization yesterday demonstrated minimal nonobstructive CAD.  Echocardiogram today demonstrated EF 60%, wall motion cannot be accurately assessed, systolic function normal, RV cavity size normal, trace MR, trace TR.    Objective:     Vitals: Blood pressure 127/62, pulse 71, temperature 98.4 °F (36.9 °C), resp. rate 19, height 5' (1.524 m), weight (!) 147 kg (325 lb), SpO2 94%., Body mass index is 63.47 kg/m².,   Orthostatic Blood Pressures      Flowsheet Row Most Recent Value   Blood Pressure 127/62 filed at 04/01/2025 0830   Patient Position - Orthostatic VS Sitting filed at 03/31/2025 2300              Intake/Output Summary (Last 24 hours) at 4/1/2025 1544  Last data filed at 4/1/2025 0600  Gross per 24 hour   Intake 120 ml   Output 600 ml   Net -480 ml         Physical Exam:   GEN: Alert and oriented x 3, in no acute distress.  Well appearing and well nourished.   HEENT: Sclera anicteric, conjunctivae pink, mucous membranes moist. Oropharynx clear.   NECK: Supple, no significant JVD. Trachea midline.   HEART: Regular rhythm, normal S1 and  S2, no murmurs, clicks, gallops or rubs. PMI nondisplaced, no thrills.   LUNGS: Clear to auscultation bilaterally; no wheezes, rales, or rhonchi. No increased work of breathing or signs of respiratory distress.   ABDOMEN: Soft, nontender, non-distended.   EXTREMITIES: Skin warm and well perfused, no clubbing, cyanosis, or edema.  NEURO: No focal findings. Normal speech. Mood and affect normal.   SKIN: Normal without suspicious lesions on exposed skin.        Medications:      Current Facility-Administered Medications:     acetaminophen (Ofirmev) injection 1,000 mg, 1,000 mg, Intravenous, Q6H PRN, Vernon Tenorio MD    aluminum-magnesium hydroxide-simethicone (MAALOX) oral suspension 30 mL, 30 mL, Oral, Q6H PRN, Vernon Tenorio MD    aspirin chewable tablet 81 mg, 81 mg, Oral, Daily, Vernon Tenorio MD, 81 mg at 04/01/25 0958    atorvastatin (LIPITOR) tablet 20 mg, 20 mg, Oral, QPM, Vernon Tenorio MD, 20 mg at 03/31/25 1747    benzonatate (TESSALON PERLES) capsule 100 mg, 100 mg, Oral, TID PRN, Susannah Roberts PA-C, 100 mg at 03/31/25 2224    carvedilol (COREG) tablet 12.5 mg, 12.5 mg, Oral, BID With Meals, Vernon Tenorio MD, 12.5 mg at 04/01/25 1002    Diclofenac Sodium (VOLTAREN) 1 % topical gel 2 g, 2 g, Topical, 4x Daily PRN, Vernon Tenorio MD    Diclofenac Sodium (VOLTAREN) 1 % topical gel 2 g, 2 g, Topical, 4x Daily, Vernon Tenorio MD, 2 g at 04/01/25 1004    fluticasone (FLONASE) 50 mcg/act nasal spray 1 spray, 1 spray, Each Nare, BID, Vernon Tenorio MD, 1 spray at 04/01/25 1004    HYDROcodone-acetaminophen (NORCO) 5-325 mg per tablet 2 tablet, 2 tablet, Oral, Q6H PRN, Susannah Roberts PA-C, 2 tablet at 04/01/25 1404    lidocaine (LIDODERM) 5 % patch 1 patch, 1 patch, Topical, Daily PRN, Vernon Tenorio MD, 1 patch at 03/31/25 2224    lidocaine (LIDODERM) 5 % patch 1 patch, 1 patch, Topical, Daily, Vernon crandall  MD Lisa, 1 patch at 04/01/25 0958    loratadine (CLARITIN) tablet 10 mg, 10 mg, Oral, Daily, Vernon Tenorio MD, 10 mg at 04/01/25 0957    losartan (COZAAR) tablet 100 mg, 100 mg, Oral, Daily, 100 mg at 04/01/25 0957 **AND** [DISCONTINUED] hydroCHLOROthiazide tablet 25 mg, 25 mg, Oral, Daily, Natty Bah PA-C, 25 mg at 03/30/25 0853    nitroglycerin (NITROSTAT) SL tablet 0.4 mg, 0.4 mg, Sublingual, Q5 Min PRN, Vernon Tenorio MD    ondansetron (ZOFRAN) injection 4 mg, 4 mg, Intravenous, Q6H PRN, Vernon Tenorio MD, 4 mg at 03/31/25 1925    pantoprazole (PROTONIX) EC tablet 40 mg, 40 mg, Oral, Daily, Vernon Tenorio MD, 40 mg at 04/01/25 0958    promethazine (PHENERGAN) injection 25 mg, 25 mg, Intravenous, Q6H PRN, Vernon Tenorio MD, 25 mg at 03/30/25 1441     Labs & Results:    Results from last 7 days   Lab Units 03/30/25  0920 03/30/25  0649 03/30/25  0452 03/30/25  0230 03/30/25  0039 03/29/25  2222   HS TNI 0HR ng/L  --   --  5,435*  --   --  1,493*   HS TNI 2HR ng/L  --  5,457*  --   --  2,659*  --    HS TNI 4HR ng/L 4,344*  --   --  4,206*  --   --    HSTNI D4 ng/L -1,091  --   --  2,713*  --   --      Results from last 7 days   Lab Units 04/01/25  0521 03/31/25  0536 03/30/25  0039   WBC Thousand/uL 8.82 10.85* 12.60*   HEMOGLOBIN g/dL 10.3* 10.8* 12.1   HEMATOCRIT % 32.1* 33.5* 37.5   PLATELETS Thousands/uL 188 200 239         Results from last 7 days   Lab Units 04/01/25  0521 03/31/25  1629 03/31/25  0536 03/29/25  2222   POTASSIUM mmol/L 3.4* 3.2* 2.7* 3.7   CHLORIDE mmol/L 106 104 103 100   CO2 mmol/L 26 25 27 27   BUN mg/dL 24 19 19 25   CREATININE mg/dL 1.05 0.89 0.92 0.94   CALCIUM mg/dL 8.3* 8.8 8.9 10.1   ALK PHOS U/L 68  --  75 97   ALT U/L 23  --  18 15   AST U/L 33  --  46* 29     Results from last 7 days   Lab Units 03/31/25  0915 03/31/25  0152 03/30/25  1952 03/30/25  0649 03/30/25  0039   INR   --   --   --   --  1.07   PTT seconds 96* 56*  60*   < > 30    < > = values in this interval not displayed.     Results from last 7 days   Lab Units 03/31/25  0536   MAGNESIUM mg/dL 1.9       ** Please Note: Fluency DirectDictation voice to text software may have been used in the creation of this document. **

## (undated) DEVICE — CATH DIAG 5FR .045 100CM FR4

## (undated) DEVICE — CATH DIAG 5FR IMPULSE 100CM FL3.5

## (undated) DEVICE — GLIDESHEATH SLENDER STAINLESS STEEL KIT: Brand: GLIDESHEATH SLENDER

## (undated) DEVICE — DGW .035 FC J3MM 260CM TEF: Brand: EMERALD